# Patient Record
Sex: FEMALE | Race: WHITE | NOT HISPANIC OR LATINO | Employment: FULL TIME | URBAN - METROPOLITAN AREA
[De-identification: names, ages, dates, MRNs, and addresses within clinical notes are randomized per-mention and may not be internally consistent; named-entity substitution may affect disease eponyms.]

---

## 2018-08-28 ENCOUNTER — OFFICE VISIT (OUTPATIENT)
Dept: FAMILY MEDICINE | Facility: CLINIC | Age: 33
End: 2018-08-28
Payer: COMMERCIAL

## 2018-08-28 DIAGNOSIS — M79.10 MYALGIA: Primary | ICD-10-CM

## 2018-08-28 DIAGNOSIS — R53.83 TIREDNESS: ICD-10-CM

## 2018-08-28 DIAGNOSIS — M25.50 ARTHRALGIA, UNSPECIFIED JOINT: ICD-10-CM

## 2018-08-28 DIAGNOSIS — L65.9 ALOPECIA: ICD-10-CM

## 2018-08-28 PROCEDURE — 99213 OFFICE O/P EST LOW 20 MIN: CPT | Performed by: FAMILY MEDICINE

## 2018-08-28 NOTE — PROGRESS NOTES
Subjective      Patient ID: Naomi Lopez is a 33 y.o. female.    CC:  Not feeling well,  Flu like/viral syndrome     HPI : Pt is not feeling well intermittently over the past month .   Decreased energy ,  Just last day post nasal drip and into chest now . Slight cough.    Denies fever, chills. Some bodyaches. Denies sore throat or swollen glands.   No rashes or myalgias. Perhaps had a mosquito or spider bite on thigh . Does go in woods on runs a lot.  Denies sick contacts  Traveling to [place] next wk for a week.           And pt notices a lot of hair loss .  Had finished graduate program and was very stressful and finished end of July 2018    There are no active problems to display for this patient.    Social History     Social History   • Marital status: Single     Spouse name: N/A   • Number of children: N/A   • Years of education: N/A     Occupational History   • Not on file.     Social History Main Topics   • Smoking status: Never Smoker   • Smokeless tobacco: Never Used   • Alcohol use 1.2 oz/week     1 Cans of beer, 1 Glasses of wine per week   • Drug use: No   • Sexual activity: Not on file     Other Topics Concern   • Not on file     Social History Narrative   • No narrative on file     No current outpatient prescriptions on file.    The following have been reviewed and updated as appropriate in this visit:  Allergies  Meds  Problems       Review of Systems   Constitutional: Positive for fatigue. Negative for appetite change, chills, fever and unexpected weight change.        Decreased energy   HENT: Positive for postnasal drip. Negative for congestion, dental problem, ear pain and sore throat.    Eyes: Negative for photophobia, redness and visual disturbance.   Respiratory: Positive for cough. Negative for shortness of breath and wheezing.    Cardiovascular: Negative for chest pain, palpitations and leg swelling.   Gastrointestinal: Negative for abdominal distention, constipation, diarrhea,  "nausea and vomiting.   Genitourinary: Negative for difficulty urinating, dysuria, flank pain, frequency and urgency.   Musculoskeletal: Negative for arthralgias and myalgias.   Skin:        + hair thinning and loss   Neurological: Negative for dizziness, syncope, weakness, numbness and headaches.       Objective     Vitals:    08/28/18 1925   BP: 108/70   BP Location: Left upper arm   Patient Position: Sitting   Pulse: 74   Temp: 36.8 °C (98.3 °F)   TempSrc: Oral   SpO2: 96%   Weight: 75.8 kg (167 lb)   Height: 1.808 m (5' 11.18\")     Body mass index is 23.17 kg/m².    Physical Exam   Constitutional: She is oriented to person, place, and time. She appears well-developed and well-nourished. She does not appear ill. No distress.   HENT:   Head: Normocephalic and atraumatic.   Nose: Nose normal.   Mouth/Throat: Oropharynx is clear and moist. No oropharyngeal exudate.   Eyes: Conjunctivae are normal. Pupils are equal, round, and reactive to light. Right eye exhibits no discharge. Left eye exhibits no discharge. No scleral icterus.   Neck: Neck supple. No thyromegaly present.   Cardiovascular: Normal rate, regular rhythm and normal heart sounds.  Exam reveals no gallop.    No murmur heard.  Pulmonary/Chest: Effort normal and breath sounds normal. No respiratory distress. She has no wheezes. She has no rales.   Musculoskeletal: She exhibits no edema, tenderness or deformity.   Lymphadenopathy:     She has no cervical adenopathy.   Neurological: She is alert and oriented to person, place, and time.   Skin: No rash noted. She is not diaphoretic. No erythema. No pallor.   No patches of hair loss visible but hair is thinning throughout   Psychiatric: She has a normal mood and affect. Her behavior is normal.       Assessment/Plan   Problem List Items Addressed This Visit     None      Visit Diagnoses     Myalgia    -  Primary    Relevant Orders    TSH w reflex FT4    CBC and differential    Comprehensive metabolic panel    " Lyme EIA reflex WB    Umu-Barr virus VCA antibody panel    Arthralgia, unspecified joint        Relevant Orders    TSH w reflex FT4    CBC and differential    Comprehensive metabolic panel    Lyme EIA reflex WB    Umu-Barr virus VCA antibody panel    Tiredness        Relevant Orders    TSH w reflex FT4    CBC and differential    Comprehensive metabolic panel    Lyme EIA reflex WB    Umu-Barr virus VCA antibody panel    Vitamin D 25 hydroxy    Alopecia        Relevant Orders    Vitamin D 25 hydroxy      assessment /plan :   Diagnosis not clear after exam .  1. Viral like syndrome : most likely diagnosis for sx's of tiredness, myalgia , arthralgias .  Ordered lab : CMP , cbc w/ dif, TFT's Vit D /B12  , lyme and EBV titers ( hx of previous mono)    Recommended supportive tx with drinking plenty of water, eating regular healthy food choices . May take tylenol as needed.      2. Alopecia : ordered TFT's ,Vit D level.    3. Traveling to [place] next week if  Lab normal and feeling better. Checked [facility] website  travel recommendations . Will be at elevation and not going to go caving or high risk areas. Will be low risk for malaria so prophylaxis was not recommended.  Pt has had yellow fever vaccine yrs ago .  UTD Tdap 01.3017.      RTO as needed .

## 2018-08-29 NOTE — PATIENT INSTRUCTIONS
Instructions :   1. Go for lab work .     2. Drink plenty of water .  Continue choosing healthy foods .  Exercise when feeling better     Dr Salinas will contact you after your labs are back

## 2018-08-30 VITALS
HEART RATE: 74 BPM | HEIGHT: 71 IN | WEIGHT: 167 LBS | BODY MASS INDEX: 23.38 KG/M2 | DIASTOLIC BLOOD PRESSURE: 70 MMHG | TEMPERATURE: 98.3 F | SYSTOLIC BLOOD PRESSURE: 108 MMHG | OXYGEN SATURATION: 96 %

## 2018-08-30 LAB
25(OH)D3+25(OH)D2 SERPL-MCNC: 19.2 NG/ML (ref 30–100)
ALBUMIN SERPL-MCNC: 4.7 G/DL (ref 3.5–5.5)
ALBUMIN/GLOB SERPL: 2 {RATIO} (ref 1.2–2.2)
ALP SERPL-CCNC: 47 IU/L (ref 39–117)
ALT SERPL-CCNC: 14 IU/L (ref 0–32)
AST SERPL-CCNC: 18 IU/L (ref 0–40)
B BURGDOR IGG+IGM SER-ACNC: <0.91 ISR (ref 0–0.9)
B BURGDOR IGM SER IA-ACNC: <0.8 INDEX (ref 0–0.79)
BASOPHILS # BLD AUTO: 0 X10E3/UL (ref 0–0.2)
BASOPHILS NFR BLD AUTO: 0 %
BILIRUB SERPL-MCNC: 0.8 MG/DL (ref 0–1.2)
BUN SERPL-MCNC: 13 MG/DL (ref 6–20)
BUN/CREAT SERPL: 20 (ref 9–23)
CALCIUM SERPL-MCNC: 9.5 MG/DL (ref 8.7–10.2)
CHLORIDE SERPL-SCNC: 101 MMOL/L (ref 96–106)
CO2 SERPL-SCNC: 23 MMOL/L (ref 20–29)
CREAT SERPL-MCNC: 0.65 MG/DL (ref 0.57–1)
EBV EA IGG SER-ACNC: 12.6 U/ML (ref 0–8.9)
EBV NA IGG SER IA-ACNC: 419 U/ML (ref 0–17.9)
EBV VCA IGG SER IA-ACNC: 119 U/ML (ref 0–17.9)
EBV VCA IGM SER IA-ACNC: <36 U/ML (ref 0–35.9)
EOSINOPHIL # BLD AUTO: 0.2 X10E3/UL (ref 0–0.4)
EOSINOPHIL NFR BLD AUTO: 2 %
ERYTHROCYTE [DISTWIDTH] IN BLOOD BY AUTOMATED COUNT: 14.3 % (ref 12.3–15.4)
GLOBULIN SER CALC-MCNC: 2.3 G/DL (ref 1.5–4.5)
GLUCOSE SERPL-MCNC: 105 MG/DL (ref 65–99)
HCT VFR BLD AUTO: 37.7 % (ref 34–46.6)
HGB BLD-MCNC: 12.4 G/DL (ref 11.1–15.9)
IMM GRANULOCYTES # BLD: 0 X10E3/UL (ref 0–0.1)
IMM GRANULOCYTES NFR BLD: 0 %
LAB CORP EGFR IF AFRICN AM: 135 ML/MIN/1.73
LAB CORP EGFR IF NONAFRICN AM: 117 ML/MIN/1.73
LYMPHOCYTES # BLD AUTO: 1.9 X10E3/UL (ref 0.7–3.1)
LYMPHOCYTES NFR BLD AUTO: 27 %
MCH RBC QN AUTO: 28.4 PG (ref 26.6–33)
MCHC RBC AUTO-ENTMCNC: 32.9 G/DL (ref 31.5–35.7)
MCV RBC AUTO: 87 FL (ref 79–97)
MONOCYTES # BLD AUTO: 0.3 X10E3/UL (ref 0.1–0.9)
MONOCYTES NFR BLD AUTO: 4 %
NEUTROPHILS # BLD AUTO: 4.8 X10E3/UL (ref 1.4–7)
NEUTROPHILS NFR BLD AUTO: 67 %
PLATELET # BLD AUTO: 214 X10E3/UL (ref 150–379)
POTASSIUM SERPL-SCNC: 4.5 MMOL/L (ref 3.5–5.2)
PROT SERPL-MCNC: 7 G/DL (ref 6–8.5)
RBC # BLD AUTO: 4.36 X10E6/UL (ref 3.77–5.28)
SERVICE CMNT-IMP: ABNORMAL
SODIUM SERPL-SCNC: 138 MMOL/L (ref 134–144)
T4 FREE SERPL-MCNC: 1.1 NG/DL (ref 0.82–1.77)
TSH SERPL DL<=0.005 MIU/L-ACNC: 1.51 UIU/ML (ref 0.45–4.5)
WBC # BLD AUTO: 7.2 X10E3/UL (ref 3.4–10.8)

## 2018-08-30 ASSESSMENT — ENCOUNTER SYMPTOMS
MYALGIAS: 0
APPETITE CHANGE: 0
ARTHRALGIAS: 0
DIFFICULTY URINATING: 0
PHOTOPHOBIA: 0
COUGH: 1
ABDOMINAL DISTENTION: 0
NUMBNESS: 0
DIARRHEA: 0
VOMITING: 0
FEVER: 0
CHILLS: 0
FATIGUE: 1
WEAKNESS: 0
FLANK PAIN: 0
CONSTIPATION: 0
EYE REDNESS: 0
DIZZINESS: 0
DYSURIA: 0
WHEEZING: 0
SORE THROAT: 0
SHORTNESS OF BREATH: 0
UNEXPECTED WEIGHT CHANGE: 0
NAUSEA: 0
FREQUENCY: 0
PALPITATIONS: 0
HEADACHES: 0

## 2018-10-25 ENCOUNTER — OFFICE VISIT (OUTPATIENT)
Dept: FAMILY MEDICINE | Facility: CLINIC | Age: 33
End: 2018-10-25
Payer: COMMERCIAL

## 2018-10-25 VITALS
HEIGHT: 71 IN | OXYGEN SATURATION: 99 % | BODY MASS INDEX: 23.24 KG/M2 | DIASTOLIC BLOOD PRESSURE: 62 MMHG | TEMPERATURE: 97.9 F | HEART RATE: 67 BPM | WEIGHT: 166 LBS | SYSTOLIC BLOOD PRESSURE: 118 MMHG

## 2018-10-25 DIAGNOSIS — Z23 NEED FOR VACCINATION: ICD-10-CM

## 2018-10-25 DIAGNOSIS — F41.9 ANXIETY: ICD-10-CM

## 2018-10-25 DIAGNOSIS — Z00.00 PHYSICAL EXAM: Primary | ICD-10-CM

## 2018-10-25 PROCEDURE — 99395 PREV VISIT EST AGE 18-39: CPT | Mod: GC,25 | Performed by: STUDENT IN AN ORGANIZED HEALTH CARE EDUCATION/TRAINING PROGRAM

## 2018-10-25 PROCEDURE — 86580 TB INTRADERMAL TEST: CPT | Performed by: STUDENT IN AN ORGANIZED HEALTH CARE EDUCATION/TRAINING PROGRAM

## 2018-10-25 PROCEDURE — 90686 IIV4 VACC NO PRSV 0.5 ML IM: CPT | Performed by: STUDENT IN AN ORGANIZED HEALTH CARE EDUCATION/TRAINING PROGRAM

## 2018-10-25 PROCEDURE — 90471 IMMUNIZATION ADMIN: CPT | Performed by: STUDENT IN AN ORGANIZED HEALTH CARE EDUCATION/TRAINING PROGRAM

## 2018-10-25 ASSESSMENT — ENCOUNTER SYMPTOMS
PALPITATIONS: 0
DIARRHEA: 0
CONSTIPATION: 0
DIZZINESS: 0
ACTIVITY CHANGE: 0
LIGHT-HEADEDNESS: 0
HEADACHES: 0
ABDOMINAL PAIN: 0
APPETITE CHANGE: 0
COUGH: 0
NUMBNESS: 0
ABDOMINAL DISTENTION: 0
SHORTNESS OF BREATH: 0
CONFUSION: 0
CHILLS: 0
AGITATION: 0
VOMITING: 0
NAUSEA: 0

## 2018-10-25 NOTE — PROGRESS NOTES
[facility]     CHIEF COMPLAINT   Naomi Lopez is a 33 y.o. female who presents to the office for physical examination.     HISTORY OF PRESENT ILLNESS      Patient presents to the office today for physical exam as she is seeking employment and wants to make sure she is up to date on all vaccinations. Today she requested flu shot as well as PPD TB skin test.    She states that she had a history of allopecia which has been improving since her graduation from school recently and has no more concerns at this time. She is not on medications and states that her stress and anxiety was associated with school and is no longer causing her any issues.    No acute events at today's visit    PAST MEDICAL AND SURGICAL HISTORY      PMHx:  Patient Active Problem List    Diagnosis Date Noted   • Anxiety 10/25/2018     PSHx:  Past Surgical History:   Procedure Laterality Date   • ORIF HUMERUS FRACTURE Right 1991     Reviewed at today's visit with patient.  MEDICATIONS      No current outpatient prescriptions on file.  Reviewed at today's visit with patient.  ALLERGIES      Patient has no known allergies.  Reviewed at today's visit with patient.  FAMILY HISTORY      Family History   Problem Relation Age of Onset   • Gout Mother      Reviewed at today's visit with patient.  SOCIAL HISTORY      Social History     Social History   • Marital status: Single     Spouse name: N/A   • Number of children: N/A   • Years of education: N/A     Occupational History   • Therapist       Social History Main Topics   • Smoking status: Never Smoker   • Smokeless tobacco: Never Used   • Alcohol use 1.2 oz/week     1 Glasses of wine, 1 Cans of beer per week   • Drug use: No   • Sexual activity: Not Currently     Other Topics Concern   • None     Social History Narrative   • None     Reviewed at today's visit with patient.  REVIEW OF SYSTEMS      .Review of Systems   Constitutional: Negative for activity change, appetite change  "and chills.   Respiratory: Negative for cough and shortness of breath.    Cardiovascular: Negative for chest pain and palpitations.   Gastrointestinal: Negative for abdominal distention, abdominal pain, constipation, diarrhea, nausea and vomiting.   Neurological: Negative for dizziness, light-headedness, numbness and headaches.   Psychiatric/Behavioral: Negative for agitation, behavioral problems and confusion.     PHYSICAL EXAMINATION      /62   Pulse 67   Temp 36.6 °C (97.9 °F)   Ht 1.791 m (5' 10.5\")   Wt 75.3 kg (166 lb)   SpO2 99%   BMI 23.48 kg/m²   Body mass index is 23.48 kg/m².    Physical Exam   Constitutional: She is oriented to person, place, and time. She appears well-developed and well-nourished. No distress.   HENT:   Head: Normocephalic and atraumatic.   Cardiovascular: Normal rate, regular rhythm, normal heart sounds and intact distal pulses.  Exam reveals no friction rub.    No murmur heard.  Pulmonary/Chest: Effort normal and breath sounds normal. No respiratory distress. She has no wheezes.   Abdominal: Soft. Bowel sounds are normal. She exhibits no distension. There is no tenderness.   Neurological: She is alert and oriented to person, place, and time.   Skin: Skin is warm and dry. She is not diaphoretic.   Psychiatric: She has a normal mood and affect. Her behavior is normal.   Vitals reviewed.    LABS / IMAGING / STUDIES      Lab Results   Component Value Date    CREATININE 0.65 08/29/2018     Lab Results   Component Value Date    WBC 7.2 08/29/2018    HGB 12.4 08/29/2018    HCT 37.7 08/29/2018    MCV 87 08/29/2018     08/29/2018     No results found for: CHOL, TRIG, HDL, LDL  No results found for: HGBA1C  No results found for: MICROALBUR, [id number]     Reviewed at today's visit with patient.     ASSESSMENT AND PLAN   Problem List Items Addressed This Visit     Anxiety     Discussed generalized anxiety about her recent school work. Patient states she is markedly improved " since she was last here in August.    Will call the office as needed for anxiety like symptoms worsening           Other Visit Diagnoses     Physical exam    -  Primary    Relevant Orders    TB Skin Test (Completed)    Need for vaccination        Relevant Orders    Influenza vaccine quadrivalent preservative free 6 mon and older IM (FluLaval) (Completed)           Tr Hernandez DO  10/25/18  11:53 AM

## 2018-10-25 NOTE — ASSESSMENT & PLAN NOTE
Discussed generalized anxiety about her recent school work. Patient states she is markedly improved since she was last here in August.    Will call the office as needed for anxiety like symptoms worsening

## 2018-10-27 LAB
INDURATION: 0 MM
TB SKIN TEST: NEGATIVE

## 2018-10-27 NOTE — PROGRESS NOTES
I performed a history and physical examination of the patient and discussed the management with the Resident. I reviewed the Resident's note and agree with the documented findings and plan of care,

## 2018-11-20 ENCOUNTER — TELEPHONE (OUTPATIENT)
Dept: FAMILY MEDICINE | Facility: CLINIC | Age: 33
End: 2018-11-20

## 2018-11-20 NOTE — TELEPHONE ENCOUNTER
----- Message from Marilee Salinas DO sent at 11/19/2018  7:10 PM EST -----  Regarding: form   I placed a form In your bin however I don't see pt returned for the PPD reading .     If so document results on form   I signed it   If not she will need to have it redone

## 2019-01-28 ENCOUNTER — OFFICE VISIT (OUTPATIENT)
Dept: FAMILY MEDICINE | Facility: CLINIC | Age: 34
End: 2019-01-28

## 2019-01-28 VITALS
BODY MASS INDEX: 24.94 KG/M2 | SYSTOLIC BLOOD PRESSURE: 120 MMHG | OXYGEN SATURATION: 99 % | WEIGHT: 174.2 LBS | DIASTOLIC BLOOD PRESSURE: 70 MMHG | HEIGHT: 70 IN | HEART RATE: 77 BPM | TEMPERATURE: 98.3 F

## 2019-01-28 DIAGNOSIS — J01.00 ACUTE NON-RECURRENT MAXILLARY SINUSITIS: Primary | ICD-10-CM

## 2019-01-28 PROCEDURE — 99213 OFFICE O/P EST LOW 20 MIN: CPT | Performed by: FAMILY MEDICINE

## 2019-01-28 RX ORDER — AMOXICILLIN AND CLAVULANATE POTASSIUM 875; 125 MG/1; MG/1
1 TABLET, FILM COATED ORAL 2 TIMES DAILY
Qty: 20 TABLET | Refills: 0 | Status: SHIPPED | OUTPATIENT
Start: 2019-01-28 | End: 2019-08-05 | Stop reason: ALTCHOICE

## 2019-01-28 ASSESSMENT — ENCOUNTER SYMPTOMS
CHILLS: 0
COUGH: 1
WHEEZING: 0
MYALGIAS: 0
PALPITATIONS: 0
RHINORRHEA: 0
ADENOPATHY: 0
APPETITE CHANGE: 0
HEADACHES: 1
DIZZINESS: 0
FATIGUE: 1
SHORTNESS OF BREATH: 0
SORE THROAT: 1
EYE ITCHING: 0
EYE REDNESS: 0
FEVER: 0
EYE DISCHARGE: 0

## 2019-01-28 NOTE — PATIENT INSTRUCTIONS
Instructions :   1. Take amoxicillin 875 mg /clav twice a day for 10 days with food .   Also take a probiotic over the counter daily .     2. May add fluticasone nasal spray and mucinex 600 mg daily     Return to see Dr Salinas as needed

## 2019-01-28 NOTE — PROGRESS NOTES
[facility]     CHIEF COMPLAINT     CC: sinus sx' s, sorethroat and fatigue for 2.5 wks   HISTORY OF PRESENT ILLNESS      This is a 33 y.o. female who presents for  Feeling tired , drained , sore throat , nasal congestion , headache for 2.5 wks. Took advil , helped alittle .   No fever, chills, .  +  bodyaches. + Occasional cough .    Denies n/v/d or swollen glands .   Hx of previous mono     PAST MEDICAL AND SURGICAL HISTORY      Past Medical History:   Diagnosis Date   • Chalazion    • Rosacea    • Rosacea        Past Surgical History:   Procedure Laterality Date   • ORIF HUMERUS FRACTURE Right 1991       MEDICATIONS        Current Outpatient Prescriptions:   •  amoxicillin-pot clavulanate (AUGMENTIN) 875-125 mg per tablet, Take 1 tablet by mouth 2 (two) times a day for 10 days. With food, Disp: 20 tablet, Rfl: 0    ALLERGIES      Patient has no known allergies.    FAMILY HISTORY          SOCIAL HISTORY      Social History     Social History   • Marital status: Single     Spouse name: N/A   • Number of children: N/A   • Years of education: N/A     Occupational History   • Therapist       Social History Main Topics   • Smoking status: Never Smoker   • Smokeless tobacco: Never Used   • Alcohol use 1.2 oz/week     1 Glasses of wine, 1 Cans of beer per week   • Drug use: No   • Sexual activity: Not Currently     Other Topics Concern   • Not on file     Social History Narrative   • No narrative on file       REVIEW OF SYSTEMS      .Review of Systems   Constitutional: Positive for fatigue. Negative for appetite change, chills and fever.   HENT: Positive for congestion, postnasal drip and sore throat. Negative for ear pain, rhinorrhea and tinnitus.    Eyes: Negative for discharge, redness and itching.   Respiratory: Positive for cough. Negative for shortness of breath and wheezing.    Cardiovascular: Negative for chest pain and palpitations.   Musculoskeletal: Negative for myalgias.   Skin:  "Negative for rash.   Neurological: Positive for headaches. Negative for dizziness.   Hematological: Negative for adenopathy.       PHYSICAL EXAMINATION      /70 (BP Location: Left upper arm, Patient Position: Sitting)   Pulse 77   Temp 36.8 °C (98.3 °F)   Ht 1.778 m (5' 10\")   Wt 79 kg (174 lb 3.2 oz)   SpO2 99%   BMI 25.00 kg/m²   Body mass index is 25 kg/m².    Physical Exam   Constitutional: She appears well-developed and well-nourished. She does not appear ill. No distress.   HENT:   Head: Normocephalic and atraumatic.   Right Ear: External ear normal.   Left Ear: External ear normal.   Mouth/Throat: Oropharynx is clear and moist. No oropharyngeal exudate.   Nares : + nasal turbinate swelling   + b/l facial sinus TTP    Eyes: Conjunctivae are normal. Right eye exhibits no discharge. Left eye exhibits no discharge. No scleral icterus.   Cardiovascular: Normal rate, regular rhythm and normal heart sounds.  Exam reveals no gallop.    No murmur heard.  Pulmonary/Chest: Effort normal and breath sounds normal. She has no wheezes. She has no rales.   Lymphadenopathy:     She has no cervical adenopathy.   Neurological: She is alert.   Psychiatric: She has a normal mood and affect. Her behavior is normal.       LABS / IMAGING / STUDIES        Labs  Lab Results   Component Value Date    CREATININE 0.65 08/29/2018     Lab Results   Component Value Date    WBC 7.2 08/29/2018    HGB 12.4 08/29/2018    HCT 37.7 08/29/2018    MCV 87 08/29/2018     08/29/2018         No results found for: HGBA1C  No results found for: MICROALBUR, [id number]    HEALTH MAINTENANCE              ASSESSMENT AND PLAN   Problem List Items Addressed This Visit     None      Visit Diagnoses     Acute non-recurrent maxillary sinusitis    -  Primary      assessment/plan:   1. Acute sinusitis :    Take amoxicillin 875 mg /clav twice a day for 10 days with food .   Also take a probiotic over the counter daily .     2. May add fluticasone " nasal spray and mucinex 600 mg daily       Return to see Dr Salinas as needed          Marilee Salinas, DO  01/28/19

## 2019-08-05 ENCOUNTER — OFFICE VISIT (OUTPATIENT)
Dept: FAMILY MEDICINE | Facility: CLINIC | Age: 34
End: 2019-08-05
Payer: COMMERCIAL

## 2019-08-05 VITALS
HEIGHT: 72 IN | HEART RATE: 70 BPM | DIASTOLIC BLOOD PRESSURE: 80 MMHG | TEMPERATURE: 98.1 F | OXYGEN SATURATION: 99 % | SYSTOLIC BLOOD PRESSURE: 100 MMHG | BODY MASS INDEX: 23.57 KG/M2 | WEIGHT: 174 LBS

## 2019-08-05 DIAGNOSIS — Z00.00 ENCOUNTER FOR GENERAL ADULT MEDICAL EXAMINATION WITHOUT ABNORMAL FINDINGS: Primary | ICD-10-CM

## 2019-08-05 DIAGNOSIS — L65.9 ALOPECIA: ICD-10-CM

## 2019-08-05 DIAGNOSIS — E55.9 VITAMIN D DEFICIENCY: ICD-10-CM

## 2019-08-05 DIAGNOSIS — Z13.220 SCREENING, LIPID: ICD-10-CM

## 2019-08-05 PROBLEM — L71.8 ACNE ROSACEA, ERYTHEMATOUS TELANGIECTATIC TYPE: Status: ACTIVE | Noted: 2017-01-30

## 2019-08-05 PROCEDURE — 99395 PREV VISIT EST AGE 18-39: CPT | Performed by: FAMILY MEDICINE

## 2019-08-05 ASSESSMENT — ENCOUNTER SYMPTOMS
WHEEZING: 0
DYSURIA: 0
SORE THROAT: 0
SHORTNESS OF BREATH: 0
DIARRHEA: 0
APPETITE CHANGE: 0
FREQUENCY: 0
ADENOPATHY: 0
DIZZINESS: 0
HEADACHES: 0
NUMBNESS: 0
SINUS PAIN: 0
PALPITATIONS: 0
FATIGUE: 0
DYSPHORIC MOOD: 0
FLANK PAIN: 0
COUGH: 0
EYE REDNESS: 0
CONSTIPATION: 0
MYALGIAS: 0
VOMITING: 0
FEVER: 0
ARTHRALGIAS: 0
CHILLS: 0
NAUSEA: 0

## 2019-08-05 NOTE — PROGRESS NOTES
[facility]     CHIEF COMPLAINT     CC:annual physical    HISTORY OF PRESENT ILLNESS      This is a 33 y.o. 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    ALLERGIES      Patient has no known allergies.    FAMILY HISTORY      Family History   Problem Relation Age of Onset   • Gout Mother    • Heart attack Maternal Grandfather    • Stomach cancer Paternal Grandmother    • Alzheimer's disease Paternal Grandfather        SOCIAL HISTORY      Social History     Social History   • Marital status: Single     Spouse name: N/A   • Number of children: N/A   • Years of education: N/A     Occupational History   • Therapist       Social History Main Topics   • Smoking status: Never Smoker   • Smokeless tobacco: Never Used   • Alcohol use 1.2 oz/week     1 Glasses of wine, 1 Cans of beer per week      Comment: socially    • Drug use: No   • Sexual activity: Not Currently     Other Topics Concern   • None     Social History Narrative   • None     Goal :  Consistency for Yoga and exercise     PhQ2: 0    ACP: Discussed ACP with pt .  Staff will provide patient with  ACP packet   REVIEW OF SYSTEMS      .Review of Systems   Constitutional: Negative for appetite change, chills, fatigue and fever.   HENT: Negative for congestion, postnasal drip, sinus pain and sore  "throat.    Eyes: Negative for redness and visual disturbance.   Respiratory: Negative for cough, shortness of breath and wheezing.    Cardiovascular: Negative for chest pain, palpitations and leg swelling.   Gastrointestinal: Negative for constipation, diarrhea, nausea and vomiting.   Genitourinary: Negative for dysuria, flank pain, frequency and urgency.   Musculoskeletal: Negative for arthralgias, gait problem and myalgias.   Skin: Negative for rash.        Hair falls out    Neurological: Negative for dizziness, syncope, numbness and headaches.   Hematological: Negative for adenopathy.   Psychiatric/Behavioral: Negative for dysphoric mood.       PHYSICAL EXAMINATION      /80 (BP Location: Left upper arm, Patient Position: Sitting)   Pulse 70   Temp 36.7 °C (98.1 °F)   Ht 1.816 m (5' 11.5\")   Wt 78.9 kg (174 lb)   SpO2 99%   BMI 23.93 kg/m²   Body mass index is 23.93 kg/m².    Physical Exam   Constitutional: She is oriented to person, place, and time. She appears well-nourished. She does not appear ill. No distress.   HENT:   Head: Normocephalic.   Right Ear: External ear normal.   Left Ear: External ear normal.   Nose: Nose normal.   Mouth/Throat: Oropharynx is clear and moist. No oropharyngeal exudate.   Eyes: Pupils are equal, round, and reactive to light. Conjunctivae are normal. Right eye exhibits no discharge. Left eye exhibits no discharge. No scleral icterus.   Neck: No thyromegaly present.   Cardiovascular: Normal rate, regular rhythm, normal heart sounds and intact distal pulses.  Exam reveals no gallop.    No murmur heard.  Pulmonary/Chest: Effort normal and breath sounds normal. No respiratory distress. She has no wheezes. She has no rales.   Abdominal: Soft. She exhibits no mass. There is no tenderness. There is no guarding.   Musculoskeletal: Normal range of motion. She exhibits no tenderness or deformity.   Lymphadenopathy:     She has no cervical adenopathy.   Neurological: She is alert " and oriented to person, place, and time.   Skin: Skin is warm and dry. No rash noted. No erythema. No pallor.   Psychiatric: She has a normal mood and affect. Her behavior is normal.       LABS / IMAGING / STUDIES        Labs  Lab Results   Component Value Date    CREATININE 0.65 08/29/2018     Lab Results   Component Value Date    WBC 7.2 08/29/2018    HGB 12.4 08/29/2018    HCT 37.7 08/29/2018    MCV 87 08/29/2018     08/29/2018         No results found for: HGBA1C  No results found for: JEANNIE MAJANOHUR    HEALTH MAINTENANCE           PAP /HPV: 5/23/19. [place].. Hx of abn PAP     TDAP: 1/30/2017  Flu: 10/25/2018     ASSESSMENT AND PLAN   Problem List Items Addressed This Visit     None      Visit Diagnoses     Encounter for general adult medical examination without abnormal findings    -  Primary    Vitamin D deficiency        Relevant Orders    Basic metabolic panel    Vitamin D 25 hydroxy    Alopecia        Relevant Orders    CBC and differential    Basic metabolic panel    TSH w reflex FT4    Vitamin B12        Screening, lipid        Relevant Orders    Lipid panel      assessment/plan:   1. General medical   healthy goals:   -Exercise goal   is 150 min/wk of aerobic exercise and 2 days per week of flexibility and resistance exercises.     - . advised healthy dietary choices ,  -Advised patient to complete regular dental examinations, brushing and flossing daily.   Encouraged enhanced safety by wearing seat belts, checking smoke and CO detectors.       -reviewed [facility] today . See above .     -ordered fasting lab    2. Chronic conditions: stable .   -hair thinning /falls out more than usual .  On vitamin D supp.  Hx Vitamin D deficiency   Recheck Vitamin D level ,TFT,  and basic labs . Stressors with work . 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 Should help . If persists refer Derm    RTO  1 year vanna Salinas, DO  08/05/19

## 2019-08-05 NOTE — PATIENT INSTRUCTIONS
Instructions:   1. Follow healthy dietary choices and exercise    Exercise  goal  is 150 min/wk of aerobic exercise and 2 days per week of flexibility and resistance exercises.     2,. Drink plenty of water   3. Go for Fasting lab     Return in a year for physical

## 2019-08-07 LAB
25(OH)D3+25(OH)D2 SERPL-MCNC: 23.5 NG/ML (ref 30–100)
BASOPHILS # BLD AUTO: 0 X10E3/UL (ref 0–0.2)
BASOPHILS NFR BLD AUTO: 0 %
EOSINOPHIL # BLD AUTO: 0.1 X10E3/UL (ref 0–0.4)
EOSINOPHIL NFR BLD AUTO: 2 %
ERYTHROCYTE [DISTWIDTH] IN BLOOD BY AUTOMATED COUNT: 14.4 % (ref 12.3–15.4)
HCT VFR BLD AUTO: 38.8 % (ref 34–46.6)
HGB BLD-MCNC: 12.8 G/DL (ref 11.1–15.9)
IMM GRANULOCYTES # BLD AUTO: 0 X10E3/UL (ref 0–0.1)
IMM GRANULOCYTES NFR BLD AUTO: 0 %
LYMPHOCYTES # BLD AUTO: 1.4 X10E3/UL (ref 0.7–3.1)
LYMPHOCYTES NFR BLD AUTO: 29 %
MCH RBC QN AUTO: 28.1 PG (ref 26.6–33)
MCHC RBC AUTO-ENTMCNC: 33 G/DL (ref 31.5–35.7)
MCV RBC AUTO: 85 FL (ref 79–97)
MONOCYTES # BLD AUTO: 0.3 X10E3/UL (ref 0.1–0.9)
MONOCYTES NFR BLD AUTO: 6 %
NEUTROPHILS # BLD AUTO: 3 X10E3/UL (ref 1.4–7)
NEUTROPHILS NFR BLD AUTO: 63 %
PLATELET # BLD AUTO: 226 X10E3/UL (ref 150–450)
RBC # BLD AUTO: 4.55 X10E6/UL (ref 3.77–5.28)
WBC # BLD AUTO: 4.9 X10E3/UL (ref 3.4–10.8)

## 2019-08-08 LAB
ANA TITR SER IF: NEGATIVE {TITER}
BUN SERPL-MCNC: 9 MG/DL (ref 6–20)
BUN/CREAT SERPL: 13 (ref 9–23)
CALCIUM SERPL-MCNC: 9.3 MG/DL (ref 8.7–10.2)
CHLORIDE SERPL-SCNC: 105 MMOL/L (ref 96–106)
CHOLEST SERPL-MCNC: 149 MG/DL (ref 100–199)
CO2 SERPL-SCNC: 22 MMOL/L (ref 20–29)
CREAT SERPL-MCNC: 0.67 MG/DL (ref 0.57–1)
GLUCOSE SERPL-MCNC: 88 MG/DL (ref 65–99)
HDLC SERPL-MCNC: 54 MG/DL
LAB CORP EGFR IF AFRICN AM: 134 ML/MIN/1.73
LAB CORP EGFR IF NONAFRICN AM: 116 ML/MIN/1.73
LAB CORP PLEASE NOTE:: NORMAL
LDLC SERPL CALC-MCNC: 84 MG/DL (ref 0–99)
POTASSIUM SERPL-SCNC: 4.6 MMOL/L (ref 3.5–5.2)
SODIUM SERPL-SCNC: 140 MMOL/L (ref 134–144)
T4 FREE SERPL-MCNC: 1.28 NG/DL (ref 0.82–1.77)
TRIGL SERPL-MCNC: 54 MG/DL (ref 0–149)
TSH SERPL DL<=0.005 MIU/L-ACNC: 2.56 UIU/ML (ref 0.45–4.5)
VIT B12 SERPL-MCNC: 307 PG/ML (ref 232–1245)
VLDLC SERPL CALC-MCNC: 11 MG/DL (ref 5–40)

## 2020-02-26 ENCOUNTER — APPOINTMENT (RX ONLY)
Dept: URBAN - METROPOLITAN AREA CLINIC 28 | Facility: CLINIC | Age: 35
Setting detail: DERMATOLOGY
End: 2020-02-26

## 2020-02-26 DIAGNOSIS — Z87.2 PERSONAL HISTORY OF DISEASES OF THE SKIN AND SUBCUTANEOUS TISSUE: ICD-10-CM

## 2020-02-26 DIAGNOSIS — L81.4 OTHER MELANIN HYPERPIGMENTATION: ICD-10-CM

## 2020-02-26 DIAGNOSIS — Z71.89 OTHER SPECIFIED COUNSELING: ICD-10-CM

## 2020-02-26 DIAGNOSIS — D22 MELANOCYTIC NEVI: ICD-10-CM

## 2020-02-26 DIAGNOSIS — D18.0 HEMANGIOMA: ICD-10-CM

## 2020-02-26 PROBLEM — D22.5 MELANOCYTIC NEVI OF TRUNK: Status: ACTIVE | Noted: 2020-02-26

## 2020-02-26 PROBLEM — D22.61 MELANOCYTIC NEVI OF RIGHT UPPER LIMB, INCLUDING SHOULDER: Status: ACTIVE | Noted: 2020-02-26

## 2020-02-26 PROBLEM — D22.71 MELANOCYTIC NEVI OF RIGHT LOWER LIMB, INCLUDING HIP: Status: ACTIVE | Noted: 2020-02-26

## 2020-02-26 PROBLEM — D22.62 MELANOCYTIC NEVI OF LEFT UPPER LIMB, INCLUDING SHOULDER: Status: ACTIVE | Noted: 2020-02-26

## 2020-02-26 PROBLEM — D22.72 MELANOCYTIC NEVI OF LEFT LOWER LIMB, INCLUDING HIP: Status: ACTIVE | Noted: 2020-02-26

## 2020-02-26 PROBLEM — D18.01 HEMANGIOMA OF SKIN AND SUBCUTANEOUS TISSUE: Status: ACTIVE | Noted: 2020-02-26

## 2020-02-26 PROCEDURE — 99213 OFFICE O/P EST LOW 20 MIN: CPT

## 2020-02-26 PROCEDURE — ? COUNSELING

## 2020-02-26 PROCEDURE — ? FULL BODY SKIN EXAM

## 2020-02-26 ASSESSMENT — LOCATION DETAILED DESCRIPTION DERM
LOCATION DETAILED: SUPERIOR THORACIC SPINE
LOCATION DETAILED: INFERIOR THORACIC SPINE
LOCATION DETAILED: RIGHT PROXIMAL POSTERIOR UPPER ARM
LOCATION DETAILED: LEFT PROXIMAL PRETIBIAL REGION
LOCATION DETAILED: RIGHT SUPERIOR MEDIAL UPPER BACK
LOCATION DETAILED: LEFT PROXIMAL POSTERIOR UPPER ARM
LOCATION DETAILED: RIGHT PROXIMAL PRETIBIAL REGION

## 2020-02-26 ASSESSMENT — LOCATION SIMPLE DESCRIPTION DERM
LOCATION SIMPLE: RIGHT POSTERIOR UPPER ARM
LOCATION SIMPLE: RIGHT PRETIBIAL REGION
LOCATION SIMPLE: RIGHT UPPER BACK
LOCATION SIMPLE: LEFT POSTERIOR UPPER ARM
LOCATION SIMPLE: LEFT PRETIBIAL REGION
LOCATION SIMPLE: UPPER BACK

## 2020-02-26 ASSESSMENT — LOCATION ZONE DERM
LOCATION ZONE: ARM
LOCATION ZONE: TRUNK
LOCATION ZONE: LEG

## 2020-02-26 NOTE — HPI: SKIN LESION
Has Your Skin Lesion Been Treated?: not been treated
Is This A New Presentation, Or A Follow-Up?: Skin Lesion
Which Family Member (Optional)?: Mother-SCC

## 2020-03-10 ENCOUNTER — OFFICE VISIT (OUTPATIENT)
Dept: FAMILY MEDICINE | Facility: CLINIC | Age: 35
End: 2020-03-10
Payer: COMMERCIAL

## 2020-03-10 VITALS
HEART RATE: 74 BPM | HEIGHT: 71 IN | SYSTOLIC BLOOD PRESSURE: 120 MMHG | OXYGEN SATURATION: 97 % | BODY MASS INDEX: 26.82 KG/M2 | TEMPERATURE: 98.4 F | DIASTOLIC BLOOD PRESSURE: 70 MMHG | WEIGHT: 191.6 LBS

## 2020-03-10 DIAGNOSIS — B34.9 VIRAL SYNDROME: Primary | ICD-10-CM

## 2020-03-10 PROCEDURE — 99213 OFFICE O/P EST LOW 20 MIN: CPT | Performed by: FAMILY MEDICINE

## 2020-03-10 NOTE — PROGRESS NOTES
[facility] Medicine     CHIEF COMPLAINT     CC: cough and viral symptoms    HISTORY OF PRESENT ILLNESS      This is a 34 y.o. female who presents for  Headache , runny nose , fatigue and cough  for   X 10 days .   Denies fever, chills,  Body aches  , wheezing or sob.  Had sore throat at first  but it resolved.   Pt feels chest hurts with non productive coughing .   Last night : felt a little more chest discomfort with cough .     Nothing seems to improve symptoms . Tried some tea /honey , lozenges , ? mucinex.  Pt may have been exposed to Sick contacts , possible at work . 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    ALLERGIES      Patient has no known allergies.    FAMILY HISTORY      Family History   Problem Relation Age of Onset   • Gout Biological Mother    • Heart attack Maternal Grandfather    • Stomach cancer Paternal Grandmother    • Alzheimer's disease Paternal Grandfather        SOCIAL HISTORY      Social History     Socioeconomic History   • Marital status: Single     Spouse name: Not on file   • Number of children: Not on file   • Years of education: Not on file   • Highest education level: Not on file   Occupational History   • Occupation: Therapist    Social Needs   • Financial resource strain: Not on file   • Food insecurity:     Worry: Not on file     Inability: Not on file   • Transportation  needs:     Medical: Not on file     Non-medical: Not on file   Tobacco Use   • Smoking status: Never Smoker   • Smokeless tobacco: Never Used   Substance and Sexual Activity   • Alcohol use: Yes     Alcohol/week: 2.0 standard drinks     Types: 1 Glasses of wine, 1 Cans of beer per week     Comment: socially    • Drug use: No   • Sexual activity: Not Currently   Lifestyle   • Physical activity:     Days per week: Not on file     Minutes per session: Not on file   • Stress: Not on file   Relationships   • Social connections:     Talks on phone: Not on file     Gets together: Not on file     Attends Sikhism service: Not on file     Active member of club or organization: Not on file     Attends meetings of clubs or organizations: Not on file     Relationship status: Not on file   • Intimate partner violence:     Fear of current or ex partner: Not on file     Emotionally abused: Not on file     Physically abused: Not on file     Forced sexual activity: Not on file   Other Topics Concern   • Not on file   Social History Narrative   • Not on file         Reviewed today : allergies/meds/problem list /social hx      REVIEW OF SYSTEMS      .Review of Systems   Constitutional: Positive for fatigue. Negative for chills and fever.   HENT: Positive for rhinorrhea. Negative for sinus pressure, sore throat, tinnitus and trouble swallowing.    Eyes: Negative for pain.   Respiratory: Positive for cough. Negative for shortness of breath and wheezing.    Cardiovascular: Negative for chest pain, palpitations and leg swelling.   Gastrointestinal: Negative for diarrhea, nausea and vomiting.   Musculoskeletal: Negative for myalgias.   Skin: Negative for rash.   Neurological: Positive for headaches. Negative for dizziness.   Hematological: Negative for adenopathy.   Psychiatric/Behavioral: Negative for dysphoric mood.       PHYSICAL EXAMINATION      Visit Vitals  /70 (BP Location: Left upper arm, Patient Position: Sitting)   Pulse  "74   Temp 36.9 °C (98.4 °F)   Ht 1.803 m (5' 11\")   Wt 86.9 kg (191 lb 9.6 oz)   SpO2 97%   BMI 26.72 kg/m²     Body mass index is 26.72 kg/m².    Physical Exam   Constitutional: She appears well-developed and well-nourished. She does not appear ill. No distress.   Nares :bilatreal  edematous turbinates with minimal clear appearing nasal secretion    HENT:   Head: Normocephalic and atraumatic.   Right Ear: External ear normal.   Left Ear: External ear normal.   Nose: Nose normal.   Mouth/Throat: Oropharynx is clear and moist. No oropharyngeal exudate.   + TTP bilateral maxillary sinuses    Eyes: Conjunctivae are normal. Right eye exhibits no discharge. Left eye exhibits no discharge. No scleral icterus.   Cardiovascular: Normal rate, regular rhythm and normal heart sounds. Exam reveals no gallop.   No murmur heard.  Pulmonary/Chest: Effort normal and breath sounds normal. She has no wheezes. She has no rales.   Musculoskeletal: She exhibits no edema.   Skin: No rash noted.   Psychiatric: She has a normal mood and affect. Her behavior is normal.         LABS / IMAGING / STUDIES        Labs  Lab Results   Component Value Date    CREATININE 0.67 08/07/2019     Lab Results   Component Value Date    WBC 4.9 08/07/2019    HGB 12.8 08/07/2019    HCT 38.8 08/07/2019    MCV 85 08/07/2019     08/07/2019         No results found for: HGBA1C  No results found for: MICROALBUR, [id number]    HEALTH MAINTENANCE                ASSESSMENT AND PLAN   Problem List Items Addressed This Visit     None      Visit Diagnoses     Viral syndrome    -  Primary    mucinex 600 mg bid Drink plenty of water .   Use a humidifier.   Start dayquil daily or bid         RTO if symptoms persist or worsen        Marilee Salinas, DO  03/11/20          "

## 2020-03-10 NOTE — PATIENT INSTRUCTIONS
Instructions:   1. Start mucinex 600 mg twice a day . Drink plenty of water .   Use a humidifier.   Start dayquil once or twice a day     Contact Dr Salinas if no better or worse in a few days .

## 2020-03-10 NOTE — LETTER
March 10, 2020     Patient: Naomi Lopez  YOB: 1985  Date of Visit: 3/10/2020    To Whom it May Concern:    Naomi Lopez was seen in my office  on 3/10/2020.   Please excuse her absence from work from  3/2/220 and 3/3/2020 and again 3/10/2020 due to illness .      If you have any questions or concerns, please don't hesitate to call.         Sincerely,         Marilee Salinas, DO        CC: No Recipients

## 2020-03-11 ASSESSMENT — ENCOUNTER SYMPTOMS
SHORTNESS OF BREATH: 0
CHILLS: 0
PALPITATIONS: 0
FEVER: 0
SORE THROAT: 0
DIARRHEA: 0
DIZZINESS: 0
COUGH: 1
EYE PAIN: 0
DYSPHORIC MOOD: 0
WHEEZING: 0
SINUS PRESSURE: 0
VOMITING: 0
MYALGIAS: 0
RHINORRHEA: 1
NAUSEA: 0
HEADACHES: 1
ADENOPATHY: 0
FATIGUE: 1
TROUBLE SWALLOWING: 0

## 2020-09-29 ENCOUNTER — APPOINTMENT (RX ONLY)
Dept: URBAN - METROPOLITAN AREA CLINIC 23 | Facility: CLINIC | Age: 35
Setting detail: DERMATOLOGY
End: 2020-09-29

## 2020-09-29 DIAGNOSIS — L259 CONTACT DERMATITIS AND OTHER ECZEMA, UNSPECIFIED CAUSE: ICD-10-CM

## 2020-09-29 PROBLEM — L23.9 ALLERGIC CONTACT DERMATITIS, UNSPECIFIED CAUSE: Status: ACTIVE | Noted: 2020-09-29

## 2020-09-29 PROCEDURE — ? TREATMENT REGIMEN

## 2020-09-29 PROCEDURE — ? ADDITIONAL NOTES

## 2020-09-29 PROCEDURE — 99213 OFFICE O/P EST LOW 20 MIN: CPT

## 2020-09-29 PROCEDURE — ? PRESCRIPTION

## 2020-09-29 PROCEDURE — ? COUNSELING

## 2020-09-29 RX ORDER — HYDROCORTISONE 25 MG/G
CREAM TOPICAL
Qty: 1 | Refills: 0 | Status: ERX | COMMUNITY
Start: 2020-09-29

## 2020-09-29 RX ADMIN — HYDROCORTISONE: 25 CREAM TOPICAL at 00:00

## 2020-09-29 ASSESSMENT — LOCATION DETAILED DESCRIPTION DERM: LOCATION DETAILED: RIGHT MEDIAL SUPERIOR EYELID

## 2020-09-29 ASSESSMENT — LOCATION ZONE DERM: LOCATION ZONE: EYELID

## 2020-09-29 ASSESSMENT — LOCATION SIMPLE DESCRIPTION DERM: LOCATION SIMPLE: RIGHT SUPERIOR EYELID

## 2020-09-29 NOTE — PROCEDURE: TREATMENT REGIMEN
Plan: I instructed the patient to avoid wearing eye makeup until the rash resolves.
Detail Level: Simple
Initiate Treatment: Hydrocortisone 2.5% topical cream: Apply to affected areas of eyelid twice daily x 1-2 weeks until clear.

## 2020-09-29 NOTE — HPI: RASH
How Severe Is Your Rash?: mild
Is This A New Presentation, Or A Follow-Up?: Rash
Additional History: Patient states that she stopped wearing her eye makeup and the rash seems to be improving.

## 2021-04-28 ENCOUNTER — APPOINTMENT (RX ONLY)
Dept: URBAN - METROPOLITAN AREA CLINIC 28 | Facility: CLINIC | Age: 36
Setting detail: DERMATOLOGY
End: 2021-04-28

## 2021-04-28 DIAGNOSIS — D485 NEOPLASM OF UNCERTAIN BEHAVIOR OF SKIN: ICD-10-CM

## 2021-04-28 DIAGNOSIS — L72.0 EPIDERMAL CYST: ICD-10-CM

## 2021-04-28 DIAGNOSIS — L81.4 OTHER MELANIN HYPERPIGMENTATION: ICD-10-CM

## 2021-04-28 DIAGNOSIS — Z71.89 OTHER SPECIFIED COUNSELING: ICD-10-CM

## 2021-04-28 DIAGNOSIS — D18.0 HEMANGIOMA: ICD-10-CM

## 2021-04-28 DIAGNOSIS — D22 MELANOCYTIC NEVI: ICD-10-CM

## 2021-04-28 PROBLEM — D48.5 NEOPLASM OF UNCERTAIN BEHAVIOR OF SKIN: Status: ACTIVE | Noted: 2021-04-28

## 2021-04-28 PROBLEM — D22.5 MELANOCYTIC NEVI OF TRUNK: Status: ACTIVE | Noted: 2021-04-28

## 2021-04-28 PROBLEM — D22.61 MELANOCYTIC NEVI OF RIGHT UPPER LIMB, INCLUDING SHOULDER: Status: ACTIVE | Noted: 2021-04-28

## 2021-04-28 PROBLEM — D22.71 MELANOCYTIC NEVI OF RIGHT LOWER LIMB, INCLUDING HIP: Status: ACTIVE | Noted: 2021-04-28

## 2021-04-28 PROBLEM — D22.62 MELANOCYTIC NEVI OF LEFT UPPER LIMB, INCLUDING SHOULDER: Status: ACTIVE | Noted: 2021-04-28

## 2021-04-28 PROBLEM — D22.72 MELANOCYTIC NEVI OF LEFT LOWER LIMB, INCLUDING HIP: Status: ACTIVE | Noted: 2021-04-28

## 2021-04-28 PROBLEM — D18.01 HEMANGIOMA OF SKIN AND SUBCUTANEOUS TISSUE: Status: ACTIVE | Noted: 2021-04-28

## 2021-04-28 PROCEDURE — 11102 TANGNTL BX SKIN SINGLE LES: CPT | Mod: 59

## 2021-04-28 PROCEDURE — 99213 OFFICE O/P EST LOW 20 MIN: CPT | Mod: 25

## 2021-04-28 PROCEDURE — ? FULL BODY SKIN EXAM

## 2021-04-28 PROCEDURE — ? SUNSCREEN RECOMMENDATIONS

## 2021-04-28 PROCEDURE — ? COUNSELING

## 2021-04-28 PROCEDURE — ? BIOPSY BY SHAVE METHOD

## 2021-04-28 PROCEDURE — 17110 DESTRUCTION B9 LES UP TO 14: CPT

## 2021-04-28 PROCEDURE — ? BENIGN DESTRUCTION

## 2021-04-28 ASSESSMENT — LOCATION DETAILED DESCRIPTION DERM
LOCATION DETAILED: LEFT PROXIMAL POSTERIOR UPPER ARM
LOCATION DETAILED: RIGHT PROXIMAL POSTERIOR UPPER ARM
LOCATION DETAILED: SUPERIOR THORACIC SPINE
LOCATION DETAILED: RIGHT PROXIMAL PRETIBIAL REGION
LOCATION DETAILED: LEFT CENTRAL EYEBROW
LOCATION DETAILED: RIGHT MEDIAL FOREHEAD
LOCATION DETAILED: EPIGASTRIC SKIN
LOCATION DETAILED: LEFT PROXIMAL PRETIBIAL REGION
LOCATION DETAILED: RIGHT SUPERIOR MEDIAL UPPER BACK

## 2021-04-28 ASSESSMENT — LOCATION ZONE DERM
LOCATION ZONE: ARM
LOCATION ZONE: TRUNK
LOCATION ZONE: FACE
LOCATION ZONE: LEG

## 2021-04-28 ASSESSMENT — LOCATION SIMPLE DESCRIPTION DERM
LOCATION SIMPLE: LEFT EYEBROW
LOCATION SIMPLE: RIGHT POSTERIOR UPPER ARM
LOCATION SIMPLE: UPPER BACK
LOCATION SIMPLE: RIGHT UPPER BACK
LOCATION SIMPLE: RIGHT FOREHEAD
LOCATION SIMPLE: RIGHT PRETIBIAL REGION
LOCATION SIMPLE: LEFT POSTERIOR UPPER ARM
LOCATION SIMPLE: ABDOMEN
LOCATION SIMPLE: LEFT PRETIBIAL REGION

## 2021-04-28 NOTE — PROCEDURE: BENIGN DESTRUCTION
Include Z78.9 (Other Specified Conditions Influencing Health Status) As An Associated Diagnosis?: No
Consent: The patient's consent was obtained including but not limited to risks of crusting, scabbing, blistering, scarring, darker or lighter pigmentary change, recurrence, incomplete removal and infection.
Medical Necessity Clause: This procedure was medically necessary because the lesions that were treated were:
Post-Care Instructions: I reviewed with the patient in detail post-care instructions. Patient is to wear sunprotection, and avoid picking at any of the treated lesions. Pt may apply Vaseline to crusted or scabbing areas.
Medical Necessity Information: It is in your best interest to select a reason for this procedure from the list below. All of these items fulfill various CMS LCD requirements except the new and changing color options.
Anesthesia Volume In Cc: 0.5
Detail Level: Detailed
Bill Insurance (You Assume Risk Of Denial Or Audit By Selecting Yes): Yes

## 2021-07-06 ENCOUNTER — OFFICE VISIT (OUTPATIENT)
Dept: FAMILY MEDICINE | Facility: CLINIC | Age: 36
End: 2021-07-06
Payer: COMMERCIAL

## 2021-07-06 VITALS
OXYGEN SATURATION: 98 % | BODY MASS INDEX: 26.52 KG/M2 | TEMPERATURE: 98.5 F | WEIGHT: 189.4 LBS | DIASTOLIC BLOOD PRESSURE: 78 MMHG | HEIGHT: 71 IN | HEART RATE: 78 BPM | SYSTOLIC BLOOD PRESSURE: 110 MMHG

## 2021-07-06 DIAGNOSIS — B08.1 MOLLUSCUM CONTAGIOSUM: ICD-10-CM

## 2021-07-06 DIAGNOSIS — E55.9 VITAMIN D DEFICIENCY: ICD-10-CM

## 2021-07-06 DIAGNOSIS — Z00.00 ENCOUNTER FOR GENERAL ADULT MEDICAL EXAMINATION WITHOUT ABNORMAL FINDINGS: Primary | ICD-10-CM

## 2021-07-06 DIAGNOSIS — L71.8 ACNE ROSACEA, ERYTHEMATOUS TELANGIECTATIC TYPE: ICD-10-CM

## 2021-07-06 DIAGNOSIS — T78.40XA ALLERGY, INITIAL ENCOUNTER: ICD-10-CM

## 2021-07-06 DIAGNOSIS — Z13.220 SCREENING, LIPID: ICD-10-CM

## 2021-07-06 DIAGNOSIS — Z11.3 SCREEN FOR STD (SEXUALLY TRANSMITTED DISEASE): ICD-10-CM

## 2021-07-06 PROCEDURE — 99395 PREV VISIT EST AGE 18-39: CPT | Performed by: FAMILY MEDICINE

## 2021-07-06 PROCEDURE — 3008F BODY MASS INDEX DOCD: CPT | Performed by: FAMILY MEDICINE

## 2021-07-06 RX ORDER — MINERAL OIL
180 ENEMA (ML) RECTAL DAILY
Qty: 90 TABLET | Refills: 1
Start: 2021-07-06 | End: 2022-02-12

## 2021-07-06 NOTE — PATIENT INSTRUCTIONS
Instructions:   1. Schedule with allergist : Dr Adalberto Kingston     2. Go for fasting labs   See first what your out of pocket costs may be     3. Choose healthy foods and exercise   Exercise  goal  is 150 min/wk of aerobic exercise and 2 days per week of flexibility and resistance exercises.           Return to see Dr Salinas in 1 year for a physical

## 2021-07-06 NOTE — PROGRESS NOTES
[facility] Medicine     CHIEF COMPLAINT     Cc:  Annual physical    HISTORY OF PRESENT ILLNESS      This is a 35 y.o. female who presents for annual physical     Pt says she was moving window air conditioner units over the weekend .   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    FAMILY HISTORY      Family History   Problem Relation Age of Onset   • Gout Biological Mother    • Heart attack Maternal Grandfather    • Stomach cancer Paternal Grandmother    • Alzheimer's disease Paternal Grandfather        SOCIAL HISTORY      Social History     Socioeconomic History   • Marital status: Single     Spouse name: Not on file   • Number of children: Not on file   • Years of education: Not on file   • Highest education level: Not on file   Occupational History   • Occupation: Therapist    Tobacco Use   • Smoking status: Never Smoker   • Smokeless tobacco: Never Used   Substance and Sexual Activity   • Alcohol use: Yes     Alcohol/week: 2.0 standard drinks     Types: 1 Glasses of wine, 1 Cans of beer per week     Comment: socially    • Drug use: No   • Sexual activity: Not Currently   Other Topics Concern   • Not  "on file   Social History Narrative   • Not on file     Social Determinants of Health     Financial Resource Strain:    • Difficulty of Paying Living Expenses:    Food Insecurity:    • Worried About Running Out of Food in the Last Year:    • Ran Out of Food in the Last Year:    Transportation Needs:    • Lack of Transportation (Medical):    • Lack of Transportation (Non-Medical):    Physical Activity:    • Days of Exercise per Week:    • Minutes of Exercise per Session:    Stress:    • Feeling of Stress :    Social Connections:    • Frequency of Communication with Friends and Family:    • Frequency of Social Gatherings with Friends and Family:    • Attends Worship Services:    • Active Member of Clubs or Organizations:    • Attends Club or Organization Meetings:    • Marital Status:    Intimate Partner Violence:    • Fear of Current or Ex-Partner:    • Emotionally Abused:    • Physically Abused:    • Sexually Abused:          Reviewed today : allergies/meds/problem list /social hx      REVIEW OF SYSTEMS      .Review of Systems   Constitutional: Negative for chills, fatigue and fever.   HENT: Negative for postnasal drip, sore throat and tinnitus.    Eyes: Negative for pain.   Respiratory: Negative for cough, shortness of breath and wheezing.    Cardiovascular: Negative for chest pain, palpitations and leg swelling.   Gastrointestinal: Negative for diarrhea, nausea and vomiting.   Skin:        R arm bruise    Small skin lesions on fingers and hands    Neurological: Negative for dizziness and headaches.       PHYSICAL EXAMINATION      Visit Vitals  /78 (BP Location: Left upper arm, Patient Position: Sitting)   Pulse 78   Temp 36.9 °C (98.5 °F)   Ht 1.803 m (5' 11\")   Wt 85.9 kg (189 lb 6.4 oz)   SpO2 98%   BMI 26.42 kg/m²     Body mass index is 26.42 kg/m².    Physical Exam  Constitutional:       General: She is not in acute distress.     Appearance: She is well-developed. She is not diaphoretic.   HENT:      " Right Ear: Tympanic membrane, ear canal and external ear normal. There is no impacted cerumen.      Left Ear: Tympanic membrane, ear canal and external ear normal. There is no impacted cerumen.      Nose: Nose normal.   Eyes:      General: No scleral icterus.        Right eye: No discharge.         Left eye: No discharge.      Conjunctiva/sclera: Conjunctivae normal.   Neck:      Vascular: No carotid bruit.   Cardiovascular:      Rate and Rhythm: Normal rate and regular rhythm.      Heart sounds: Normal heart sounds. No murmur heard.   No gallop.    Pulmonary:      Effort: Pulmonary effort is normal. No respiratory distress.      Breath sounds: Normal breath sounds. No wheezing, rhonchi or rales.   Abdominal:      Palpations: Abdomen is soft. There is no mass.      Tenderness: There is no abdominal tenderness. There is no guarding.   Musculoskeletal:      Cervical back: Neck supple.      Right lower leg: No edema.      Left lower leg: No edema.   Skin:     Findings: Bruising (R ventral mid arm) present.   Neurological:      General: No focal deficit present.      Mental Status: She is alert.   Psychiatric:         Mood and Affect: Mood normal.           LABS / IMAGING / STUDIES        Labs  Lab Results   Component Value Date    CREATININE 0.67 08/07/2019     Lab Results   Component Value Date    WBC 4.9 08/07/2019    HGB 12.8 08/07/2019    HCT 38.8 08/07/2019    MCV 85 08/07/2019     08/07/2019         No results found for: HGBA1C  No results found for: MICROALBUR, [id number]      HEALTH MAINTENANCE         PAP /HPV cotesting :  utd [facility]      TDAP: due in 2027  Flu: due in the fall     Covid -19 : utd , fully vaccinated . Pfizer       Retinal eye exam : utd .   Dental : utd       ASSESSMENT AND PLAN   Problem List Items Addressed This Visit        Infectious/Inflammatory    Molluscum contagiosum       Other    Acne rosacea, erythematous telangiectatic type      Other Visit Diagnoses     Encounter for  general adult medical examination without abnormal findings    -  Primary    Relevant Orders    CBC and differential    Comprehensive metabolic panel    Lipid panel    TSH w reflex FT4    Vitamin D 25 hydroxy    Vitamin D deficiency        Relevant Orders    Vitamin D 25 hydroxy    Allergy, initial encounter        Relevant Orders    Ambulatory referral to Allergy    Screening, lipid        Relevant Orders    Lipid panel    Screen for STD (sexually transmitted disease)        Relevant Orders    HIV 1,2 AB P24 AG      Assessment/plan :      healthy goals:   Advise   Exercise of 150 min/wk of aerobic exercise and 2 days per week of flexibility and resistance exercises.   Encourage  healthy dietary choices  including small food portions and exercise.     Advise patient to complete regular dental examinations, brushing and flossing daily.  Encouraged patient to wear seat belts, check smoke and CO detectors .  Encouraged pt to obtain  adequate  7 hrs sleep every night   Counseled pt to practice safe sex  to prevent  STI  s.   Counseled general information for prevention of  alcohol and other substance abuse .    reviewed health maintenance including immunizations today . See under HM above for recommendations .       Pt to see if her insurance will cover referrals and labs    Allergies :  Continue fexofenadine 180 mg daily    Refer Dr Adalberto Kingston , pt is interested in allergy testing    Ordered fasting lab     RTO 1 year physical              Marilee Salinas, DO  07/07/21

## 2021-07-07 PROBLEM — B08.1 MOLLUSCUM CONTAGIOSUM: Status: ACTIVE | Noted: 2021-07-07

## 2021-07-07 ASSESSMENT — ENCOUNTER SYMPTOMS
HEADACHES: 0
FATIGUE: 0
NAUSEA: 0
FEVER: 0
SORE THROAT: 0
SHORTNESS OF BREATH: 0
COUGH: 0
WHEEZING: 0
VOMITING: 0
EYE PAIN: 0
CHILLS: 0
DIZZINESS: 0
DIARRHEA: 0
PALPITATIONS: 0

## 2021-07-22 ENCOUNTER — OFFICE VISIT (OUTPATIENT)
Dept: FAMILY MEDICINE | Facility: CLINIC | Age: 36
End: 2021-07-22
Payer: COMMERCIAL

## 2021-07-22 VITALS
HEART RATE: 77 BPM | DIASTOLIC BLOOD PRESSURE: 70 MMHG | BODY MASS INDEX: 26.47 KG/M2 | SYSTOLIC BLOOD PRESSURE: 110 MMHG | WEIGHT: 189.8 LBS | OXYGEN SATURATION: 98 % | TEMPERATURE: 97.7 F

## 2021-07-22 DIAGNOSIS — Z91.89 RISK OF EXPOSURE TO LYME DISEASE: Primary | ICD-10-CM

## 2021-07-22 PROCEDURE — 99213 OFFICE O/P EST LOW 20 MIN: CPT | Mod: GC | Performed by: STUDENT IN AN ORGANIZED HEALTH CARE EDUCATION/TRAINING PROGRAM

## 2021-07-22 PROCEDURE — 3008F BODY MASS INDEX DOCD: CPT | Performed by: STUDENT IN AN ORGANIZED HEALTH CARE EDUCATION/TRAINING PROGRAM

## 2021-07-22 ASSESSMENT — ENCOUNTER SYMPTOMS
CONSTIPATION: 0
DYSURIA: 0
WHEEZING: 0
SORE THROAT: 0
VOMITING: 0
ARTHRALGIAS: 0
FATIGUE: 0
COUGH: 0
ABDOMINAL PAIN: 0
EYE PAIN: 0
HEMATURIA: 0
FEVER: 0
WOUND: 0
NAUSEA: 0
SEIZURES: 0
NERVOUS/ANXIOUS: 0
APPETITE CHANGE: 0
DIFFICULTY URINATING: 0
DIZZINESS: 0
SHORTNESS OF BREATH: 0
RHINORRHEA: 0
PALPITATIONS: 0
DIARRHEA: 0

## 2021-07-22 NOTE — PROGRESS NOTES
I participated in an office visit   as a supervising  physician with the Resident and  Patient.   I reviewed and participated in the history and physical exam  and discussed the management of all critical elements of the visit with the patient and resident. I reviewed the resident's note and agree with the documented findings and plan of care, except for my comments below or within the additional notes today.

## 2021-07-22 NOTE — ASSESSMENT & PLAN NOTE
Unclear etiology.  C/f lyme given location geographically, summer month, and appearance of rash.  However not classical in appearance, location, or timing.  Can trial cortisone on rash in interim.  Will test and treat if meets criteria.

## 2021-07-22 NOTE — PROGRESS NOTES
"     [facility]     CHIEF COMPLAINT   Naomi Lopez is a 35 y.o. female who presents to the office for eval of bug bite.     HISTORY OF PRESENT ILLNESS      Pt presents for eval of of \"bug bite\" on medial lower LLE x 2mo.  When she first noticed it, it appeared to be a molluscum lesion, which pt has a history of.  She started applying tea tree oil, after which it became erythematous.  She stopped using the tea tree oil approx 3-4 days ago, and now notices a ring surrounding the lesion.  She has been applying bandages to the area, with adhesive that line up with this ring.  States lesion was initially pruritic.  Denies discharge.      Admits to mild headache and some thoracic pain but otherwise denies complaints.    Admits to frequent outdoor exposure, as she frequents the [employer] often.    PAST MEDICAL AND SURGICAL HISTORY      PMHx:  Patient Active Problem List    Diagnosis Date Noted   • Risk of exposure to Lyme disease 07/22/2021   • Molluscum contagiosum 07/07/2021   • Anxiety 10/25/2018   • Acne rosacea, erythematous telangiectatic type 01/30/2017     PSHx:  Past Surgical History:   Procedure Laterality Date   • ORIF HUMERUS FRACTURE Right 1991     MEDICATIONS        Current Outpatient Medications:   •  fexofenadine (ALLEGRA) 180 mg tablet, Take 1 tablet (180 mg total) by mouth daily., Disp: 90 tablet, Rfl: 1     ALLERGIES      Patient has no known allergies.  FAMILY HISTORY      Family History   Problem Relation Age of Onset   • Gout Biological Mother    • Heart attack Maternal Grandfather    • Stomach cancer Paternal Grandmother    • Alzheimer's disease Paternal Grandfather      SOCIAL HISTORY      Social History     Socioeconomic History   • Marital status: Single     Spouse name: Not on file   • Number of children: Not on file   • Years of education: Not on file   • Highest education level: Not on file   Occupational History   • Occupation: Therapist    Tobacco Use   • Smoking " status: Never Smoker   • Smokeless tobacco: Never Used   Substance and Sexual Activity   • Alcohol use: Yes     Alcohol/week: 2.0 standard drinks     Types: 1 Glasses of wine, 1 Cans of beer per week     Comment: socially    • Drug use: No   • Sexual activity: Not Currently   Other Topics Concern   • Not on file   Social History Narrative   • Not on file     Social Determinants of Health     Financial Resource Strain:    • Difficulty of Paying Living Expenses:    Food Insecurity:    • Worried About Running Out of Food in the Last Year:    • Ran Out of Food in the Last Year:    Transportation Needs:    • Lack of Transportation (Medical):    • Lack of Transportation (Non-Medical):    Physical Activity:    • Days of Exercise per Week:    • Minutes of Exercise per Session:    Stress:    • Feeling of Stress :    Social Connections:    • Frequency of Communication with Friends and Family:    • Frequency of Social Gatherings with Friends and Family:    • Attends [employer] Services:    • Active Member of Clubs or Organizations:    • Attends Club or Organization Meetings:    • Marital Status:    Intimate Partner Violence:    • Fear of Current or Ex-Partner:    • Emotionally Abused:    • Physically Abused:    • Sexually Abused:      REVIEW OF SYSTEMS      .Review of Systems   Constitutional: Negative for appetite change, fatigue and fever.   HENT: Negative for congestion, hearing loss, rhinorrhea and sore throat.    Eyes: Negative for pain and visual disturbance.   Respiratory: Negative for cough, shortness of breath and wheezing.    Cardiovascular: Negative for chest pain, palpitations and leg swelling.   Gastrointestinal: Negative for abdominal pain, constipation, diarrhea, nausea and vomiting.   Genitourinary: Negative for difficulty urinating, dysuria and hematuria.   Musculoskeletal: Negative for arthralgias.   Skin: Positive for rash. Negative for wound.   Neurological: Negative for dizziness, seizures and syncope.    Psychiatric/Behavioral: Negative for behavioral problems. The patient is not nervous/anxious.      PHYSICAL EXAMINATION      Visit Vitals  /70   Pulse 77   Temp 36.5 °C (97.7 °F)   Wt 86.1 kg (189 lb 12.8 oz)   SpO2 98%   BMI 26.47 kg/m²     Body mass index is 26.47 kg/m².    Physical Exam  Constitutional:       General: She is not in acute distress.     Appearance: She is well-developed. She is not diaphoretic.   HENT:      Head: Normocephalic and atraumatic.   Eyes:      Pupils: Pupils are equal, round, and reactive to light.   Cardiovascular:      Rate and Rhythm: Normal rate and regular rhythm.      Heart sounds: Normal heart sounds. No murmur heard.   No friction rub. No gallop.    Pulmonary:      Effort: Pulmonary effort is normal. No respiratory distress.      Breath sounds: Normal breath sounds. No wheezing or rales.   Abdominal:      General: Bowel sounds are normal. There is no distension.      Palpations: Abdomen is soft.      Tenderness: There is no abdominal tenderness. There is no guarding or rebound.   Musculoskeletal:         General: No deformity. Normal range of motion.      Cervical back: Normal range of motion and neck supple.      Right lower leg: No edema.      Left lower leg: No edema.   Skin:     General: Skin is warm and dry.      Coloration: Skin is not jaundiced.      Findings: Rash (erythematous macule w/ central clearing and pericentral erythema.  Slightly scaly) present.   Neurological:      Mental Status: She is alert and oriented to person, place, and time.   Psychiatric:         Mood and Affect: Mood normal.         Behavior: Behavior normal.           LABS / IMAGING / STUDIES      Lab Results   Component Value Date    CREATININE 0.67 08/07/2019    CREATININE 0.65 08/29/2018     Lab Results   Component Value Date    WBC 4.9 08/07/2019    HGB 12.8 08/07/2019    HCT 38.8 08/07/2019    MCV 85 08/07/2019     08/07/2019     Lab Results   Component Value Date    CHOL 149  08/07/2019    TRIG 54 08/07/2019    HDL 54 08/07/2019     No results found for: HGBA1C  No results found for: JONG MAJANO        [facility]   ...   ASSESSMENT AND PLAN   Problem List Items Addressed This Visit        Other    Risk of exposure to Lyme disease - Primary     Unclear etiology.  C/f lyme given location geographically, summer month, and appearance of rash.  However not classical in appearance, location, or timing.  Can trial cortisone on rash in interim.  Will test and treat if meets criteria.         Relevant Orders    Lyme Disease Antibodies (IgG, IgM),               Luis Felipe Mosley,   7/22/2021        Some or all of this note has been written using voice recognition software.    Please excuse any typographical errors.

## 2021-07-23 LAB
B BURGDOR IGG PATRN SER IB-IMP: NEGATIVE
B BURGDOR IGM PATRN SER IB-IMP: NEGATIVE
B BURGDOR18KD IGG SER QL IB: NORMAL
B BURGDOR23KD IGG SER QL IB: NORMAL
B BURGDOR23KD IGM SER QL IB: NORMAL
B BURGDOR28KD IGG SER QL IB: NORMAL
B BURGDOR30KD IGG SER QL IB: NORMAL
B BURGDOR39KD IGG SER QL IB: NORMAL
B BURGDOR39KD IGM SER QL IB: NORMAL
B BURGDOR41KD IGG SER QL IB: NORMAL
B BURGDOR41KD IGM SER QL IB: NORMAL
B BURGDOR45KD IGG SER QL IB: NORMAL
B BURGDOR58KD IGG SER QL IB: NORMAL
B BURGDOR66KD IGG SER QL IB: NORMAL
B BURGDOR93KD IGG SER QL IB: NORMAL
HIV 1+2 AB+HIV1 P24 AG SERPL QL IA: NON REACTIVE

## 2021-09-15 ENCOUNTER — OFFICE VISIT (OUTPATIENT)
Dept: FAMILY MEDICINE | Facility: CLINIC | Age: 36
End: 2021-09-15
Payer: COMMERCIAL

## 2021-09-15 VITALS — HEART RATE: 83 BPM | OXYGEN SATURATION: 98 % | BODY MASS INDEX: 27.53 KG/M2 | WEIGHT: 197.4 LBS | TEMPERATURE: 97.7 F

## 2021-09-15 DIAGNOSIS — S96.911A STRAIN OF RIGHT ANKLE, INITIAL ENCOUNTER: ICD-10-CM

## 2021-09-15 DIAGNOSIS — M25.571 ACUTE RIGHT ANKLE PAIN: Primary | ICD-10-CM

## 2021-09-15 PROCEDURE — 99213 OFFICE O/P EST LOW 20 MIN: CPT | Performed by: FAMILY MEDICINE

## 2021-09-15 PROCEDURE — 3008F BODY MASS INDEX DOCD: CPT | Performed by: FAMILY MEDICINE

## 2021-09-15 ASSESSMENT — ENCOUNTER SYMPTOMS
NUMBNESS: 0
CHILLS: 0
WEAKNESS: 0
FEVER: 0
EYE PAIN: 0
ARTHRALGIAS: 1
JOINT SWELLING: 1
FATIGUE: 0

## 2021-09-15 NOTE — PROGRESS NOTES
[facility] Medicine     CHIEF COMPLAINT     CC: follow up to Urgent care visit    HISTORY OF PRESENT ILLNESS      This is a 36 y.o. female who presents for  Follow up to  visit in [facility]   For R ankle swelling .Pt experienced an   Inversion injury when she stumbled , spun around and fell. Her R  Ankle felt unstable   Pt has some discoloration however did not notice bruising until  several days later.  Discoloration is on the  top of her  foot     Iced it, elevated ankle and took ibuprofen and tylenol for pain first and is wearing a  Half cam boot provided by      Feeling better. Less swelling however still present   May have been grade 1or 2  sprain .    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    FAMILY HISTORY      Family History   Problem Relation Age of Onset   • Gout Biological Mother    • Heart attack Maternal Grandfather    • Stomach cancer Paternal Grandmother    • Alzheimer's disease Paternal Grandfather        SOCIAL HISTORY      Social History     Socioeconomic History   • Marital status: Single     Spouse name: Not on file   • Number of children: Not on file   • Years of education: Not on file   • Highest education level: Not on file   Occupational History   • Occupation: Therapist    Tobacco Use   • Smoking status: Never Smoker   • Smokeless tobacco: Never Used    Substance and Sexual Activity   • Alcohol use: Yes     Alcohol/week: 2.0 standard drinks     Types: 1 Glasses of wine, 1 Cans of beer per week     Comment: socially    • Drug use: No   • Sexual activity: Not Currently   Other Topics Concern   • Not on file   Social History Narrative   • Not on file     Social Determinants of Health     Financial Resource Strain:    • Difficulty of Paying Living Expenses:    Food Insecurity:    • Worried About Running Out of Food in the Last Year:    • Ran Out of Food in the Last Year:    Transportation Needs:    • Lack of Transportation (Medical):    • Lack of Transportation (Non-Medical):    Physical Activity:    • Days of Exercise per Week:    • Minutes of Exercise per Session:    Stress:    • Feeling of Stress :    Social Connections:    • Frequency of Communication with Friends and Family:    • Frequency of Social Gatherings with Friends and Family:    • Attends [employer] Services:    • Active Member of Clubs or Organizations:    • Attends Club or Organization Meetings:    • Marital Status:    Intimate Partner Violence:    • Fear of Current or Ex-Partner:    • Emotionally Abused:    • Physically Abused:    • Sexually Abused:          Reviewed today : allergies/meds/problem list /social hx      REVIEW OF SYSTEMS      .Review of Systems   Constitutional: Negative for chills, fatigue and fever.   Eyes: Negative for pain.   Musculoskeletal: Positive for arthralgias (R ankle) and joint swelling (R ankle).   Neurological: Negative for weakness and numbness.       PHYSICAL EXAMINATION      Visit Vitals  Pulse 83   Temp 36.5 °C (97.7 °F)   Wt 89.5 kg (197 lb 6.4 oz)   SpO2 98%   BMI 27.53 kg/m²     Body mass index is 27.53 kg/m².    Physical Exam  Constitutional:       General: She is not in acute distress.     Appearance: She is well-developed. She is not ill-appearing.   HENT:      Head: Normocephalic.   Eyes:      General: No scleral icterus.        Right eye: No discharge.          Left eye: No discharge.      Conjunctiva/sclera: Conjunctivae normal.   Musculoskeletal:         General: Swelling, tenderness and deformity present.      Comments: R  ankle with +1 swelling at lateral malleolus .   Alittle posterior ankle TTP   Pt able to move ankle in all rom .     Skin:     Findings: Bruising (fading bruise around R lateral foot adn ankle ) present.   Psychiatric:         Behavior: Behavior normal.           LABS / IMAGING / STUDIES        Labs  Lab Results   Component Value Date    CREATININE 0.67 08/07/2019     Lab Results   Component Value Date    WBC 4.9 08/07/2019    HGB 12.8 08/07/2019    HCT 38.8 08/07/2019    MCV 85 08/07/2019     08/07/2019         No results found for: HGBA1C  No results found for: JEANNIE MAJANOHUR      HEALTH MAINTENANCE                ASSESSMENT AND PLAN   Problem List Items Addressed This Visit     None      Visit Diagnoses     Acute right ankle pain    -  Primary    Relevant Orders    Ambulatory referral to Podiatry    Ambulatory referral to Physical Therapy    Strain of right ankle, initial encounter        Relevant Orders    Ambulatory referral to Podiatry    Ambulatory referral to Physical Therapy      Assessment /plan:   R ankle pain / ligament strain and sprain  Take tylenol ES for pain as needed  [facility] Dr Adalberto Pacheco for eval fo R ankle sprain   Continue to ice , elevated , and brace .  Change from cam boot to support ankle brace     Refer for PT 2-3 days /wk for ankle strengthening exercises and tx    RTO prn       Marilee Salinas, DO   09/15/21

## 2021-09-15 NOTE — PATIENT INSTRUCTIONS
Instructions:   1. Continue to ice , elevate , wear an ankle brace     2. Schedule appt with Podiatry , Dr Pacheco and associates     3. Schedule for PT .  [facility]    Return to see Dr Rita blum

## 2022-02-09 ENCOUNTER — OFFICE VISIT (OUTPATIENT)
Dept: FAMILY MEDICINE | Facility: CLINIC | Age: 37
End: 2022-02-09
Payer: COMMERCIAL

## 2022-02-09 VITALS
TEMPERATURE: 97.8 F | WEIGHT: 202 LBS | BODY MASS INDEX: 27.36 KG/M2 | HEART RATE: 83 BPM | OXYGEN SATURATION: 98 % | HEIGHT: 72 IN

## 2022-02-09 DIAGNOSIS — S50.01XA TRAUMATIC ECCHYMOSIS OF RIGHT ELBOW, INITIAL ENCOUNTER: Primary | ICD-10-CM

## 2022-02-09 PROCEDURE — 3008F BODY MASS INDEX DOCD: CPT | Performed by: STUDENT IN AN ORGANIZED HEALTH CARE EDUCATION/TRAINING PROGRAM

## 2022-02-09 PROCEDURE — 99213 OFFICE O/P EST LOW 20 MIN: CPT | Mod: GC | Performed by: STUDENT IN AN ORGANIZED HEALTH CARE EDUCATION/TRAINING PROGRAM

## 2022-02-09 NOTE — PROGRESS NOTES
[facility] Family Medicine     CHIEF COMPLAINT   Naomi Lopez is a 36 y.o. female who presents to the office for evaluation of elbow bruising.     HISTORY OF PRESENT ILLNESS        PMHx anxiety, environmental allergies    Presents for bruising around R elbow.    She donated blood about 1 week ago. At that time she had significant bruising near site of needle extending to medial R elbow.  She additionally has red/bruised area of extensor surface of R forearm that she noticed more recently, in the past couple of days.    She called [facility] when she noticed pain and bruising and was instructed that there could be sme nerve irritation in addition to the bruising which may take several weeks to months to resolve. She has started using warm compresses in the past few days.      PAST MEDICAL AND SURGICAL HISTORY      PMHx:  Patient Active Problem List    Diagnosis Date Noted   • Traumatic ecchymosis of right elbow 02/12/2022   • Risk of exposure to Lyme disease 07/22/2021   • Molluscum contagiosum 07/07/2021   • Anxiety 10/25/2018   • Acne rosacea, erythematous telangiectatic type 01/30/2017     PSHx:  Past Surgical History:   Procedure Laterality Date   • ORIF HUMERUS FRACTURE Right 1991     Reviewed at today's visit with patient.  MEDICATIONS      No current outpatient medications on file.  Reviewed at today's visit with patient.  ALLERGIES      Patient has no known allergies.  Reviewed at today's visit with patient.  FAMILY HISTORY      Family History   Problem Relation Age of Onset   • Gout Biological Mother    • Heart attack Maternal Grandfather    • Stomach cancer Paternal Grandmother    • Alzheimer's disease Paternal Grandfather      Reviewed at today's visit with patient.  SOCIAL HISTORY      Social History     Socioeconomic History   • Marital status: Single     Spouse name: None   • Number of children: None   • Years of education: None   • Highest education level: None   Occupational History   •  Occupation: Therapist    Tobacco Use   • Smoking status: Never Smoker   • Smokeless tobacco: Never Used   Substance and Sexual Activity   • Alcohol use: Yes     Alcohol/week: 2.0 standard drinks     Types: 1 Glasses of wine, 1 Cans of beer per week     Comment: socially    • Drug use: No   • Sexual activity: Not Currently   Other Topics Concern   • None   Social History Narrative   • None     Social Determinants of Health     Financial Resource Strain: Not on file   Food Insecurity: Not on file   Transportation Needs: Not on file   Physical Activity: Not on file   Stress: Not on file   Social Connections: Not on file   Intimate Partner Violence: Not on file   Housing Stability: Not on file     Reviewed at today's visit with patient.  REVIEW OF SYSTEMS      .Review of Systems   Constitutional: Negative for chills and fever.   HENT: Negative for ear pain, hearing loss, sinus pain, sneezing, sore throat and voice change.    Eyes: Negative for pain.   Respiratory: Negative for cough, shortness of breath and wheezing.    Cardiovascular: Negative for chest pain and palpitations.   Gastrointestinal: Negative for abdominal pain, constipation, diarrhea, nausea and vomiting.   Endocrine: Negative for polyphagia and polyuria.   Genitourinary: Negative for dysuria, hematuria, urgency and vaginal discharge.   Musculoskeletal: Negative for back pain.   Skin: Negative for rash.        Bruising of R elbow   Neurological: Negative for dizziness and numbness.     PHYSICAL EXAMINATION      Visit Vitals  Pulse 83   Temp 36.6 °C (97.8 °F)   Ht 1.829 m (6')   Wt 91.6 kg (202 lb)   SpO2 98%   BMI 27.40 kg/m²     Body mass index is 27.4 kg/m².    Physical Exam  Constitutional:       General: She is not in acute distress.     Appearance: She is not ill-appearing, toxic-appearing or diaphoretic.   HENT:      Head: Normocephalic and atraumatic.      Right Ear: Tympanic membrane normal.      Left Ear: Tympanic membrane normal.      Nose: Nose  normal.      Mouth/Throat:      Mouth: Mucous membranes are moist.      Pharynx: Oropharynx is clear.   Eyes:      Extraocular Movements: Extraocular movements intact.      Pupils: Pupils are equal, round, and reactive to light.   Cardiovascular:      Rate and Rhythm: Normal rate and regular rhythm.      Heart sounds: Normal heart sounds. No murmur heard.  Pulmonary:      Effort: Pulmonary effort is normal. No respiratory distress.      Breath sounds: Normal breath sounds. No wheezing, rhonchi or rales.   Abdominal:      General: Bowel sounds are normal. There is no distension.      Tenderness: There is no abdominal tenderness. There is no guarding or rebound.   Musculoskeletal:         General: No swelling. Normal range of motion.      Cervical back: Neck supple. No muscular tenderness.   Lymphadenopathy:      Cervical: No cervical adenopathy.   Skin:     General: Skin is warm and dry.      Findings: Bruising present. No rash.      Comments: ~ 5in of healing bruising of medial R elbow with extension of bruising/erythema to extensor surface. No warmth, swelling, tenderness.   Neurological:      General: No focal deficit present.      Mental Status: She is alert and oriented to person, place, and time.      Motor: No weakness.   Psychiatric:         Mood and Affect: Mood normal.         Behavior: Behavior normal.         Thought Content: Thought content normal.         Judgment: Judgment normal.       LABS / IMAGING / STUDIES      Lab Results   Component Value Date    CREATININE 0.67 08/07/2019     Lab Results   Component Value Date    WBC 4.9 08/07/2019    HGB 12.8 08/07/2019    HCT 38.8 08/07/2019    MCV 85 08/07/2019     08/07/2019     Lab Results   Component Value Date    CHOL 149 08/07/2019    TRIG 54 08/07/2019    HDL 54 08/07/2019     No results found for: HGBA1C  No results found for: MICROALBUR, [id number]     Reviewed at today's visit with patient.     HEALTH MAINTENANCE   ...     ASSESSMENT AND PLAN    Problem List Items Addressed This Visit        Other    Traumatic ecchymosis of right elbow - Primary     No signs of infection, the appearance on extensor surface appears that there may be some dependent tracking of the bruising. She will take a photo today and monitor closely for any spreading or increase in tenderness, warmth. Otherwise, continue warm/cool compresses and elevation.                Damaris Zamarripa DO  02/12/22  8:31 AM

## 2022-02-12 PROBLEM — S50.01XA: Status: ACTIVE | Noted: 2022-02-12

## 2022-02-12 ASSESSMENT — ENCOUNTER SYMPTOMS
VOMITING: 0
EYE PAIN: 0
BACK PAIN: 0
DIZZINESS: 0
FEVER: 0
ABDOMINAL PAIN: 0
POLYPHAGIA: 0
VOICE CHANGE: 0
WHEEZING: 0
DYSURIA: 0
SORE THROAT: 0
DIARRHEA: 0
NUMBNESS: 0
NAUSEA: 0
PALPITATIONS: 0
HEMATURIA: 0
SINUS PAIN: 0
SHORTNESS OF BREATH: 0
COUGH: 0
CHILLS: 0
CONSTIPATION: 0

## 2022-02-12 NOTE — ASSESSMENT & PLAN NOTE
No signs of infection, the appearance on extensor surface appears that there may be some dependent tracking of the bruising. She will take a photo today and monitor closely for any spreading or increase in tenderness, warmth. Otherwise, continue warm/cool compresses and elevation.

## 2022-05-10 ENCOUNTER — TELEMEDICINE (OUTPATIENT)
Dept: URGENT CARE | Facility: CLINIC | Age: 37
End: 2022-05-10
Attending: NURSE PRACTITIONER
Payer: COMMERCIAL

## 2022-05-10 ENCOUNTER — HOSPITAL ENCOUNTER (OUTPATIENT)
Facility: CLINIC | Age: 37
Discharge: HOME | End: 2022-05-10
Attending: FAMILY MEDICINE
Payer: COMMERCIAL

## 2022-05-10 VITALS
RESPIRATION RATE: 16 BRPM | TEMPERATURE: 97.7 F | DIASTOLIC BLOOD PRESSURE: 74 MMHG | HEART RATE: 75 BPM | SYSTOLIC BLOOD PRESSURE: 118 MMHG | OXYGEN SATURATION: 99 %

## 2022-05-10 DIAGNOSIS — N76.0 ACUTE VAGINITIS: Primary | ICD-10-CM

## 2022-05-10 LAB
BILIRUBIN, POC: NEGATIVE
BLOOD URINE, POC: ABNORMAL
CLARITY, POC: CLEAR
COLOR, POC: YELLOW
EXPIRATION DATE: ABNORMAL
EXPIRATION DATE: NORMAL
GLUCOSE URINE, POC: NEGATIVE
KETONES, POC: NEGATIVE
LEUKOCYTE EST, POC: NEGATIVE
Lab: ABNORMAL
Lab: NORMAL
NITRITE, POC: NEGATIVE
PH, POC: 6
POCT MANUFACTURER: ABNORMAL
POCT MANUFACTURER: NORMAL
PREGNANCY TEST URINE, POC: NEGATIVE
PROTEIN, POC: NEGATIVE
SPECIFIC GRAVITY, POC: 1

## 2022-05-10 PROCEDURE — 81025 URINE PREGNANCY TEST: CPT | Performed by: NURSE PRACTITIONER

## 2022-05-10 PROCEDURE — 99213 OFFICE O/P EST LOW 20 MIN: CPT | Performed by: NURSE PRACTITIONER

## 2022-05-10 PROCEDURE — 81002 URINALYSIS NONAUTO W/O SCOPE: CPT | Performed by: NURSE PRACTITIONER

## 2022-05-10 PROCEDURE — 99213 OFFICE O/P EST LOW 20 MIN: CPT | Mod: 95 | Performed by: NURSE PRACTITIONER

## 2022-05-10 RX ORDER — FLUCONAZOLE 150 MG/1
TABLET ORAL
Qty: 2 TABLET | Refills: 0 | Status: SHIPPED | OUTPATIENT
Start: 2022-05-10 | End: 2023-03-02

## 2022-05-10 RX ORDER — CYCLOSPORINE 0.5 MG/ML
EMULSION OPHTHALMIC
COMMUNITY
Start: 2021-05-01

## 2022-05-10 ASSESSMENT — ENCOUNTER SYMPTOMS
BACK PAIN: 0
HEMATURIA: 0
DIARRHEA: 0
VOMITING: 0
FLANK PAIN: 0
ABDOMINAL PAIN: 0
HEMATURIA: 0
NAUSEA: 0
BACK PAIN: 0
DIARRHEA: 0
NAUSEA: 0
CHILLS: 0
APPETITE CHANGE: 0
FREQUENCY: 1
BLOOD IN STOOL: 0
HEADACHES: 0
FEVER: 0
VOMITING: 0
DYSURIA: 1
FATIGUE: 0
CHILLS: 0
DYSURIA: 0
ABDOMINAL PAIN: 0
FREQUENCY: 1
FEVER: 0
FLANK PAIN: 0

## 2022-05-10 NOTE — ED NOTES
Bed: 02  Expected date:   Expected time:   Means of arrival:   Comments:  [facility] Jessica Kemmerling, Chadwick Vega, RTR  05/10/22 [id number]

## 2022-05-10 NOTE — ED PROVIDER NOTES
"Emergency Medicine Note  HPI   HISTORY OF PRESENT ILLNESS     37 y/o female presents with concern for UTI vs vaginitis for a couple days. Had a video visit today and was referred to urgent care for testing. Reports urinary urgency / frequency, decreased urine output, urine vs vaginal odor, burning sensation at rest (not with urination), and one episode of \"white pus\" when wiping. She did have left-sided pelvic pain for a few days which resolved. Denies abnormal bleeding, fevers, chills, N/V/D, flank / back pain or hematuria. Has not tried anything for symptoms. New sexual partner last week. Used a condom. She wasn't necessarily concerned for STIs prior to her video visit. Denies history of UTIs, kidney infections, stones.             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    • Rosacea    • Rosacea    • Rosacea        Past Surgical History:   Procedure Laterality Date   • ORIF HUMERUS FRACTURE Right 1991       Family History   Problem Relation Age of Onset   • Gout Biological Mother    • Heart attack Maternal Grandfather    • Stomach cancer Paternal Grandmother    • Alzheimer's disease Paternal Grandfather        Social History     Tobacco Use   • Smoking status: Never Smoker   • Smokeless tobacco: Never Used   Vaping Use   • Vaping Use: Never used   Substance Use Topics   • Alcohol use: Yes     Alcohol/week: 2.0 standard drinks     Types: 1 Glasses of wine, 1 Cans of beer per week     Comment: socially    • Drug use: No         Review of Systems   REVIEW OF SYSTEMS     Review of Systems   Constitutional: Negative for appetite change, chills, fatigue and fever.   Gastrointestinal: Negative for abdominal pain, " diarrhea, nausea and vomiting.   Genitourinary: Positive for decreased urine volume, dysuria, frequency, urgency and vaginal discharge. Negative for flank pain, hematuria, pelvic pain, vaginal bleeding and vaginal pain.        (+) odor   Musculoskeletal: Negative for back pain.   Neurological: Negative for headaches.         VITALS     ED Vitals    Date/Time Temp Pulse Resp BP SpO2 [facility]   05/10/22 1857 36.5 °C (97.7 °F) 75 16 118/74 99 % JENARO                       Physical Exam   PHYSICAL EXAM     Physical Exam  Vitals and nursing note reviewed.   Constitutional:       General: She is awake. She is not in acute distress.     Appearance: Normal appearance. She is well-developed, well-groomed and overweight. She is not ill-appearing or toxic-appearing.   Cardiovascular:      Rate and Rhythm: Normal rate and regular rhythm.      Heart sounds: Normal heart sounds.   Pulmonary:      Effort: Pulmonary effort is normal.      Breath sounds: Normal breath sounds.   Abdominal:      General: Abdomen is flat. Bowel sounds are normal.      Palpations: Abdomen is soft.      Tenderness: There is no abdominal tenderness. There is no right CVA tenderness, left CVA tenderness or guarding.   Genitourinary:     Comments: Deferred. Pt OK to self-swab  Neurological:      Mental Status: She is alert.   Psychiatric:         Behavior: Behavior is cooperative.           PROCEDURES     Procedures     DATA     Results     None              No orders to display       Scoring tools                                 ED Course & MDM   MDM / ED COURSE / CLINICAL IMPRESSIONS / DISPO     MDM  Number of Diagnoses or Management Options  Acute vaginitis  Diagnosis management comments:     35 y/o female presents with concern for UTI vs vaginitis. Had a video visit today and was referred to urgent care for testing.  Generally well-appearing in NAD. Vitals are normal. NO abdominal pain / CVA tenderness.   Urine dip (+) trace blood. NO leuks / nitrates  HCG  negative  Vaginitis Plus sent to [facility]  I suspect likely vaginitis > UTI and patient in agreement.   Treatment options discussed. Patient agreeable to start treatment for candida and hold on treatment for any other infections until results are finalized.   Strict return precautions given. Patient was given opportunity to ask questions. Patient verbalized understanding and agreeable with plan. See dispo for full care plan.        Clinical Impressions as of 05/10/22 1948   Acute vaginitis              Yumiko Doll, PREMA  05/10/22 1955

## 2022-05-10 NOTE — PROGRESS NOTES
"Verification of Patient Location:  The patient affirms they are currently located in the following state: [place]    Request for Consent:    Audio and Video Encounter   Hello, my name is [facility].  Before we proceed, can you please verify your identification by telling me your full name and date of birth?  Can you tell me who is in the room with you?    You and I are about to have a telemedicine check-in or visit because you have requested it.  This is a live video-conference.  I am a real person, speaking to you in real time.  There is no one else with me on the video-conference.  However, when we use (tweetTV, Spoqa, etc) it is important for you to know that the video-conference may not be secure or private.  I am not recording this conversation and I am asking you not to record it.  This telemedicine visit will be billed to your health insurance or you, if you are self-insured.  You understand you will be responsible for any copayments or coinsurances that apply to your telemedicine visit.  Communication platform used for this encounter:  [employer] Video Visit (with Zoom integration)     Before starting our telemedicine visit, I am required to get your consent for this virtual check-in or visit by telemedicine. Do you consent?      Patient Response to Request for Consent:  Yes    Visit Documentation:    Subjective     Patient ID: Naomi Lopez is a 36 y.o. female.  1985      UTI sxs began yesterday:     Frequency     Decreased output     Vagina feels irritated (internal + external)     Vaginal dc \"white pus\" once     \"Urine smells stronger\"    No hematuria  No back pain  No f/c  No n/v/d    New sexual partner last week  Used a condom          The following have been reviewed and updated as appropriate in this visit:   Allergies  Meds  Problems         Review of Systems   Constitutional: Negative for chills and fever.   Gastrointestinal: Negative for abdominal pain, blood in " "stool, diarrhea, nausea and vomiting.   Genitourinary: Positive for decreased urine volume, frequency, vaginal discharge and vaginal pain. Negative for dyspareunia, dysuria, flank pain, genital sores, hematuria, pelvic pain, urgency and vaginal bleeding.   Musculoskeletal: Negative for back pain.         Assessment/Plan   Diagnoses and all orders for this visit:    Acute vaginitis (Primary)    MDM:  Advised being seen in person ([facility], GYN) to collect specimens.  Too many possibilities to diagnose over video visit.  Would like to check urine cx, gc/ct, bv/trich/candida.  [facility].\"  Explained that if she's seen by us today, we will not collect 2 copays.    May try otc monistat products and push clear fluids in the meantime.  All questions were answered during our video visit tonight.    Time Spent:  I spent 20 minutes on this date of service performing the following activities: obtaining history, documenting and providing counseling and education.  "

## 2022-05-10 NOTE — DISCHARGE INSTRUCTIONS
We will send culture to the lab and call you with the results.  You have opted to start treatment for suspected yeast infection.  Avoid sex until results are finalized.    Thank you for choosing [facility]!

## 2022-05-11 NOTE — ED ATTESTATION NOTE
The patient was evaluated and managed by the nurse practitioner.  I was present in the office and provided direct supervision.  I have reviewed and agree with the diagnosis and treatment plan.  I was avail. For any questions or concerns.       Floyd Holman MD  05/10/22 2019

## 2022-05-13 LAB
A VAGINAE DNA VAG QL NAA+PROBE: NORMAL SCORE
BACTERIA UR CULT: NORMAL
BACTERIA UR CULT: NORMAL
BVAB2 DNA VAG QL NAA+PROBE: NORMAL SCORE
C ALBICANS DNA VAG QL NAA+PROBE: NEGATIVE
C GLABRATA DNA VAG QL NAA+PROBE: NEGATIVE
C TRACH DNA VAG QL NAA+PROBE: NEGATIVE
MEGA1 DNA VAG QL NAA+PROBE: NORMAL SCORE
N GONORRHOEA DNA VAG QL NAA+PROBE: NEGATIVE
T VAGINALIS DNA VAG QL NAA+PROBE: NEGATIVE

## 2022-06-08 NOTE — PROGRESS NOTES
[facility]     CHIEF COMPLAINT   Naomi Lopez is a 36 y.o. female who presents to the office for evaluation of hip and pelvic pain.     HISTORY OF PRESENT ILLNESS          Seen within the last ~1 month at urgent care and gyn for sx of vaginal irritation, discharge, vague pelvic pain.  STI screen at  was negative, thought to potentially be due to condom/lubricant irritation.    She saw her gyn as well, repeat STI testing was negative. Pelvis US wnl.  Discharge has resolved.    Currently with L anterior hip/pelvis discomfort. This is worsened by moving with lifting and lots of steps, driving, sitting for long periods.  Seeing a chiropracter - seems to be helping.  Occasionally occurs on R too.    She is concerned whether this is related to pelvic pathology.      PAST MEDICAL AND SURGICAL HISTORY      PMHx:  Patient Active Problem List    Diagnosis Date Noted   • Strain of left iliopsoas muscle 06/12/2022   • Traumatic ecchymosis of right elbow 02/12/2022   • Risk of exposure to Lyme disease 07/22/2021   • Molluscum contagiosum 07/07/2021   • Anxiety 10/25/2018   • Acne rosacea, erythematous telangiectatic type 01/30/2017     PSHx:  Past Surgical History:   Procedure Laterality Date   • ORIF HUMERUS FRACTURE Right 1991     Reviewed at today's visit with patient.  MEDICATIONS        Current Outpatient Medications:   •  cycloSPORINE (RESTASIS) 0.05 % ophthalmic emulsion, , Disp: , Rfl:   •  fluconazole (DIFLUCAN) 150 mg tablet, Take 1 tablet by mouth today. Take 2nd dose at 72 hours if symptoms persist., Disp: 2 tablet, Rfl: 0  Reviewed at today's visit with patient.  ALLERGIES      Patient has no known allergies.  Reviewed at today's visit with patient.  FAMILY HISTORY      Family History   Problem Relation Age of Onset   • Gout Biological Mother    • Heart attack Maternal Grandfather    • Stomach cancer Paternal Grandmother    • Alzheimer's disease Paternal Grandfather      Reviewed at  today's visit with patient.  SOCIAL HISTORY      Social History     Socioeconomic History   • Marital status: Single     Spouse name: None   • Number of children: None   • Years of education: None   • Highest education level: None   Occupational History   • Occupation: Therapist    Tobacco Use   • Smoking status: Never Smoker   • Smokeless tobacco: Never Used   Vaping Use   • Vaping Use: Never used   Substance and Sexual Activity   • Alcohol use: Yes     Alcohol/week: 2.0 standard drinks     Types: 1 Glasses of wine, 1 Cans of beer per week     Comment: socially    • Drug use: No   • Sexual activity: Not Currently     Reviewed at today's visit with patient.  REVIEW OF SYSTEMS      .Review of Systems   All other systems reviewed and are negative.    PHYSICAL EXAMINATION      Visit Vitals  /82   Pulse 88   Temp 36.5 °C (97.7 °F)   Ht 1.829 m (6')   Wt 89.8 kg (198 lb)   SpO2 99%   BMI 26.85 kg/m²     Body mass index is 26.85 kg/m².    Physical Exam  Constitutional:       General: She is not in acute distress.     Appearance: She is not ill-appearing, toxic-appearing or diaphoretic.   HENT:      Head: Normocephalic and atraumatic.      Right Ear: Tympanic membrane normal.      Left Ear: Tympanic membrane normal.      Nose: Nose normal.      Mouth/Throat:      Mouth: Mucous membranes are moist.      Pharynx: Oropharynx is clear.   Eyes:      Extraocular Movements: Extraocular movements intact.      Pupils: Pupils are equal, round, and reactive to light.   Cardiovascular:      Rate and Rhythm: Normal rate and regular rhythm.      Heart sounds: Normal heart sounds. No murmur heard.  Pulmonary:      Effort: Pulmonary effort is normal. No respiratory distress.      Breath sounds: Normal breath sounds. No wheezing, rhonchi or rales.   Abdominal:      General: Bowel sounds are normal. There is no distension.      Tenderness: There is no abdominal tenderness. There is no guarding or rebound.   Musculoskeletal:          General: Tenderness ( mild, at psoas) present. No swelling.      Cervical back: Neck supple. No muscular tenderness.      Comments: Exacerbated by hip extension   Lymphadenopathy:      Cervical: No cervical adenopathy.   Skin:     General: Skin is warm and dry.      Findings: No rash.   Neurological:      General: No focal deficit present.      Mental Status: She is alert and oriented to person, place, and time.      Motor: No weakness.   Psychiatric:         Mood and Affect: Mood normal.         Behavior: Behavior normal.         Thought Content: Thought content normal.         Judgment: Judgment normal.       LABS / IMAGING / STUDIES      Lab Results   Component Value Date    CREATININE 0.67 08/07/2019     Lab Results   Component Value Date    WBC 4.9 08/07/2019    HGB 12.8 08/07/2019    HCT 38.8 08/07/2019    MCV 85 08/07/2019     08/07/2019     Lab Results   Component Value Date    CHOL 149 08/07/2019    TRIG 54 08/07/2019    HDL 54 08/07/2019     No results found for: HGBA1C  No results found for: JONG MAJANO     Reviewed at today's visit with patient.     HEALTH MAINTENANCE   ...     ASSESSMENT AND PLAN   Problem List Items Addressed This Visit        Musculoskeletal    Strain of left iliopsoas muscle     History and provocation consistent with msk pathology, not gyn.  She will try ibuprofen to help with pain/inflammation, rest, continue gentle stretching and let us know if sx fail to improve.             Other Visit Diagnoses     Routine medical exam    -  Primary    Relevant Orders    CBC and Differential    Comprehensive metabolic panel    Lipid panel    Vitamin D deficiency        Relevant Orders    Vitamin D 25 hydroxy           Damaris Zamarripa DO  06/12/22  5:42 PM

## 2022-06-09 ENCOUNTER — OFFICE VISIT (OUTPATIENT)
Dept: FAMILY MEDICINE | Facility: CLINIC | Age: 37
End: 2022-06-09
Payer: COMMERCIAL

## 2022-06-09 VITALS
TEMPERATURE: 97.7 F | OXYGEN SATURATION: 99 % | HEART RATE: 88 BPM | DIASTOLIC BLOOD PRESSURE: 82 MMHG | HEIGHT: 72 IN | SYSTOLIC BLOOD PRESSURE: 118 MMHG | WEIGHT: 198 LBS | BODY MASS INDEX: 26.82 KG/M2

## 2022-06-09 DIAGNOSIS — S76.812A STRAIN OF LEFT ILIOPSOAS MUSCLE, INITIAL ENCOUNTER: ICD-10-CM

## 2022-06-09 DIAGNOSIS — Z00.00 ROUTINE MEDICAL EXAM: Primary | ICD-10-CM

## 2022-06-09 DIAGNOSIS — E55.9 VITAMIN D DEFICIENCY: ICD-10-CM

## 2022-06-09 PROCEDURE — 99213 OFFICE O/P EST LOW 20 MIN: CPT | Mod: GC | Performed by: STUDENT IN AN ORGANIZED HEALTH CARE EDUCATION/TRAINING PROGRAM

## 2022-06-09 PROCEDURE — 3008F BODY MASS INDEX DOCD: CPT | Performed by: STUDENT IN AN ORGANIZED HEALTH CARE EDUCATION/TRAINING PROGRAM

## 2022-06-12 PROBLEM — S76.812A STRAIN OF LEFT ILIOPSOAS MUSCLE: Status: ACTIVE | Noted: 2022-06-12

## 2022-06-12 NOTE — ASSESSMENT & PLAN NOTE
History and provocation consistent with msk pathology, not gyn.  She will try ibuprofen to help with pain/inflammation, rest, continue gentle stretching and let us know if sx fail to improve.

## 2022-07-06 ENCOUNTER — APPOINTMENT (RX ONLY)
Dept: URBAN - METROPOLITAN AREA CLINIC 28 | Facility: CLINIC | Age: 37
Setting detail: DERMATOLOGY
End: 2022-07-06

## 2022-07-06 DIAGNOSIS — Z87.2 PERSONAL HISTORY OF DISEASES OF THE SKIN AND SUBCUTANEOUS TISSUE: ICD-10-CM

## 2022-07-06 DIAGNOSIS — Z71.89 OTHER SPECIFIED COUNSELING: ICD-10-CM

## 2022-07-06 DIAGNOSIS — D18.0 HEMANGIOMA: ICD-10-CM

## 2022-07-06 DIAGNOSIS — L81.4 OTHER MELANIN HYPERPIGMENTATION: ICD-10-CM

## 2022-07-06 DIAGNOSIS — D22 MELANOCYTIC NEVI: ICD-10-CM

## 2022-07-06 PROBLEM — D22.61 MELANOCYTIC NEVI OF RIGHT UPPER LIMB, INCLUDING SHOULDER: Status: ACTIVE | Noted: 2022-07-06

## 2022-07-06 PROBLEM — D18.01 HEMANGIOMA OF SKIN AND SUBCUTANEOUS TISSUE: Status: ACTIVE | Noted: 2022-07-06

## 2022-07-06 PROBLEM — D22.71 MELANOCYTIC NEVI OF RIGHT LOWER LIMB, INCLUDING HIP: Status: ACTIVE | Noted: 2022-07-06

## 2022-07-06 PROBLEM — D22.62 MELANOCYTIC NEVI OF LEFT UPPER LIMB, INCLUDING SHOULDER: Status: ACTIVE | Noted: 2022-07-06

## 2022-07-06 PROBLEM — D22.5 MELANOCYTIC NEVI OF TRUNK: Status: ACTIVE | Noted: 2022-07-06

## 2022-07-06 PROBLEM — D22.72 MELANOCYTIC NEVI OF LEFT LOWER LIMB, INCLUDING HIP: Status: ACTIVE | Noted: 2022-07-06

## 2022-07-06 PROCEDURE — ? SUNSCREEN RECOMMENDATIONS

## 2022-07-06 PROCEDURE — ? COUNSELING

## 2022-07-06 PROCEDURE — ? FULL BODY SKIN EXAM

## 2022-07-06 PROCEDURE — 99213 OFFICE O/P EST LOW 20 MIN: CPT

## 2022-07-06 ASSESSMENT — LOCATION DETAILED DESCRIPTION DERM
LOCATION DETAILED: RIGHT PROXIMAL PRETIBIAL REGION
LOCATION DETAILED: INFERIOR THORACIC SPINE
LOCATION DETAILED: EPIGASTRIC SKIN
LOCATION DETAILED: RIGHT SUPERIOR MEDIAL UPPER BACK
LOCATION DETAILED: LEFT PROXIMAL PRETIBIAL REGION
LOCATION DETAILED: RIGHT PROXIMAL POSTERIOR UPPER ARM
LOCATION DETAILED: LEFT PROXIMAL POSTERIOR UPPER ARM
LOCATION DETAILED: SUPERIOR THORACIC SPINE

## 2022-07-06 ASSESSMENT — LOCATION SIMPLE DESCRIPTION DERM
LOCATION SIMPLE: ABDOMEN
LOCATION SIMPLE: UPPER BACK
LOCATION SIMPLE: RIGHT POSTERIOR UPPER ARM
LOCATION SIMPLE: LEFT PRETIBIAL REGION
LOCATION SIMPLE: LEFT POSTERIOR UPPER ARM
LOCATION SIMPLE: RIGHT PRETIBIAL REGION
LOCATION SIMPLE: RIGHT UPPER BACK

## 2022-07-06 ASSESSMENT — LOCATION ZONE DERM
LOCATION ZONE: ARM
LOCATION ZONE: TRUNK
LOCATION ZONE: LEG

## 2022-12-05 ENCOUNTER — APPOINTMENT (RX ONLY)
Dept: URBAN - METROPOLITAN AREA CLINIC 374 | Facility: CLINIC | Age: 37
Setting detail: DERMATOLOGY
End: 2022-12-05

## 2022-12-05 DIAGNOSIS — L71.0 PERIORAL DERMATITIS: ICD-10-CM

## 2022-12-05 PROCEDURE — ? PHOTO-DOCUMENTATION

## 2022-12-05 PROCEDURE — ? COUNSELING

## 2022-12-05 PROCEDURE — ? PRESCRIPTION MEDICATION MANAGEMENT

## 2022-12-05 PROCEDURE — 99213 OFFICE O/P EST LOW 20 MIN: CPT

## 2022-12-05 PROCEDURE — ? MEDICATION COUNSELING

## 2022-12-05 PROCEDURE — ? PRESCRIPTION

## 2022-12-05 RX ORDER — METRONIDAZOLE 10 MG/G
1 GEL TOPICAL BID
Qty: 60 | Refills: 2 | Status: ERX | COMMUNITY
Start: 2022-12-05

## 2022-12-05 RX ORDER — DOXYCYCLINE 100 MG/1
1 TABLET, FILM COATED ORAL QDAY
Qty: 30 | Refills: 1 | Status: ERX | COMMUNITY
Start: 2022-12-05

## 2022-12-05 RX ADMIN — METRONIDAZOLE 1: 10 GEL TOPICAL at 00:00

## 2022-12-05 RX ADMIN — DOXYCYCLINE 1: 100 TABLET, FILM COATED ORAL at 00:00

## 2022-12-05 ASSESSMENT — LOCATION DETAILED DESCRIPTION DERM
LOCATION DETAILED: LEFT LATERAL CANTHUS
LOCATION DETAILED: RIGHT UPPER CUTANEOUS LIP

## 2022-12-05 ASSESSMENT — LOCATION ZONE DERM
LOCATION ZONE: LIP
LOCATION ZONE: EYELID

## 2022-12-05 ASSESSMENT — LOCATION SIMPLE DESCRIPTION DERM
LOCATION SIMPLE: LEFT EYELID
LOCATION SIMPLE: RIGHT LIP

## 2022-12-05 NOTE — PROCEDURE: PRESCRIPTION MEDICATION MANAGEMENT
Detail Level: Zone
Initiate Treatment: doxycycline monohydrate 100 mg tablet: Take 1 tab po QDAY with food\\nmetronidazole 1% topical gel: Apply a thin layer to AA of the face BID
Render In Strict Bullet Format?: No

## 2023-01-31 ENCOUNTER — TELEPHONE (OUTPATIENT)
Dept: FAMILY MEDICINE | Facility: CLINIC | Age: 38
End: 2023-01-31
Payer: COMMERCIAL

## 2023-02-27 NOTE — PROGRESS NOTES
[facility]     CHIEF COMPLAINT   Naomi Lopez is a 37 y.o. female who presents to the office for routine physical.     HISTORY OF PRESENT ILLNESS        PMHx anxiety, environmental allergies.    Weigh Management  Gained 11lb since June.  Used to be consistently at 180, then increased to 200 with covid. She has started exercising, but still seeing weight increase a little, unsure if due to muscle vs adipose tissue. She is otherwise feeling in her usual state of health.    Elevated HR  Sees elevated HR on monitor with [employer] class. Does not recall specific number, but sees that it is in the red zone.  No other symptoms during this time including no chest pain, abnormal dyspnea, lightheadedness.  Her heart rate has been within normal range at all of her visits here.    Lost Sense of Smell  Since covid 19 infection.  She feels that her sense of smell only recovered to about 80 or 90% of her previous.  She initially tried smell training, has not tried anything else.    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  MEDICATIONS        Current Outpatient Medications:   •  fluticasone propionate (FLONASE) 50 mcg/actuation nasal spray, Administer 1 spray into each nostril daily., Disp: 16 g, Rfl: 0  •  cycloSPORINE (RESTASIS) 0.05 % ophthalmic emulsion, , Disp: , Rfl:   Reviewed at today's visit with patient.  ALLERGIES      Patient has no known allergies.  Reviewed at today's visit with  "patient.  FAMILY HISTORY      Family History   Problem Relation Age of Onset   • Gout Biological Mother    • Heart attack Maternal Grandfather    • Stomach cancer Paternal Grandmother    • Alzheimer's disease Paternal Grandfather      Reviewed at today's visit with patient.  SOCIAL HISTORY      Social History     Socioeconomic History   • Marital status: Single     Spouse name: None   • Number of children: None   • Years of education: None   • Highest education level: None   Occupational History   • Occupation: Therapist    Tobacco Use   • Smoking status: Never   • Smokeless tobacco: Never   Vaping Use   • Vaping Use: Never used   Substance and Sexual Activity   • Alcohol use: Yes     Alcohol/week: 2.0 standard drinks     Types: 1 Glasses of wine, 1 Cans of beer per week     Comment: socially    • Drug use: No   • Sexual activity: Not Currently     Reviewed at today's visit with patient.  REVIEW OF SYSTEMS      .Review of Systems   All other systems reviewed and are negative.    PHYSICAL EXAMINATION      Visit Vitals  /82   Pulse 85   Temp 36.3 °C (97.4 °F)   Ht 1.803 m (5' 11\")   Wt 94.8 kg (209 lb)   SpO2 98%   BMI 29.15 kg/m²     Body mass index is 29.15 kg/m².    Physical Exam  Constitutional:       General: She is not in acute distress.     Appearance: She is not ill-appearing, toxic-appearing or diaphoretic.   HENT:      Head: Normocephalic and atraumatic.      Right Ear: Tympanic membrane normal.      Left Ear: Tympanic membrane normal.      Nose: Nose normal.      Mouth/Throat:      Mouth: Mucous membranes are moist.      Pharynx: Oropharynx is clear.   Eyes:      Extraocular Movements: Extraocular movements intact.      Pupils: Pupils are equal, round, and reactive to light.   Cardiovascular:      Rate and Rhythm: Normal rate and regular rhythm.      Heart sounds: Normal heart sounds. No murmur heard.  Pulmonary:      Effort: Pulmonary effort is normal. No respiratory distress.      Breath sounds: " Normal breath sounds. No wheezing, rhonchi or rales.   Abdominal:      General: Bowel sounds are normal. There is no distension.      Tenderness: There is no abdominal tenderness. There is no guarding or rebound.   Musculoskeletal:         General: No swelling. Normal range of motion.      Cervical back: Neck supple. No muscular tenderness.   Lymphadenopathy:      Cervical: No cervical adenopathy.   Skin:     General: Skin is warm and dry.      Findings: No bruising or rash.   Neurological:      General: No focal deficit present.      Mental Status: She is alert and oriented to person, place, and time.      Motor: No weakness.   Psychiatric:         Mood and Affect: Mood normal.         Behavior: Behavior normal.         Thought Content: Thought content normal.         Judgment: Judgment normal.       LABS / IMAGING / STUDIES      Lab Results   Component Value Date    CREATININE 0.67 08/07/2019     Lab Results   Component Value Date    WBC 4.9 08/07/2019    HGB 12.8 08/07/2019    HCT 38.8 08/07/2019    MCV 85 08/07/2019     08/07/2019     Lab Results   Component Value Date    CHOL 149 08/07/2019    TRIG 54 08/07/2019    HDL 54 08/07/2019     No results found for: HGBA1C  No results found for: MICROALANCA, [id number]     Reviewed at today's visit with patient.     HEALTH MAINTENANCE   ...     ASSESSMENT AND PLAN   Problem List Items Addressed This Visit        Endocrine/Metabolic    Weight gain     Gradual, no alarm symptoms.  She has identified diet will be the next place for her to work on.  Already active with fitness classes.  Offered nutrition referral, declined.  She feels that she has a good idea of the changes that she will make.  Follow-up TSH.         Relevant Orders    CBC and Differential    Comprehensive metabolic panel    Lipid panel    TSH w reflex FT4       Mental Health    Anxiety     Controlled, no SI, HI.            Other    Routine medical exam - Primary     Aim for 150 minutes of  exercise/week. Choose nutritious fresh foods and healthy fats and proteins, avoid exessive sweets/carbs.    Discussed today that elevated heart rate is a normal physiological response to exercise.  Heart rate here is appropriate.  She will pay attention to the number that she sees at work out and any associated symptoms.  Will let us know if the number is very high.         Relevant Orders    CBC and Differential    Comprehensive metabolic panel    Lipid panel   Other Visit Diagnoses     Screening, lipid        Relevant Orders    Lipid panel           Damaris Zamarripa DO  03/02/23  3:51 PM

## 2023-02-28 ENCOUNTER — TELEPHONE (OUTPATIENT)
Dept: FAMILY MEDICINE | Facility: CLINIC | Age: 38
End: 2023-02-28

## 2023-03-01 ENCOUNTER — TELEPHONE (OUTPATIENT)
Dept: FAMILY MEDICINE | Facility: CLINIC | Age: 38
End: 2023-03-01

## 2023-03-02 ENCOUNTER — OFFICE VISIT (OUTPATIENT)
Dept: FAMILY MEDICINE | Facility: CLINIC | Age: 38
End: 2023-03-02
Payer: COMMERCIAL

## 2023-03-02 VITALS
DIASTOLIC BLOOD PRESSURE: 82 MMHG | BODY MASS INDEX: 29.26 KG/M2 | HEIGHT: 71 IN | HEART RATE: 85 BPM | WEIGHT: 209 LBS | OXYGEN SATURATION: 98 % | SYSTOLIC BLOOD PRESSURE: 105 MMHG | TEMPERATURE: 97.4 F

## 2023-03-02 DIAGNOSIS — R63.5 WEIGHT GAIN: ICD-10-CM

## 2023-03-02 DIAGNOSIS — Z00.00 ROUTINE MEDICAL EXAM: Primary | ICD-10-CM

## 2023-03-02 DIAGNOSIS — Z13.220 SCREENING, LIPID: ICD-10-CM

## 2023-03-02 DIAGNOSIS — F41.9 ANXIETY: ICD-10-CM

## 2023-03-02 PROBLEM — B08.1 MOLLUSCUM CONTAGIOSUM: Status: RESOLVED | Noted: 2021-07-07 | Resolved: 2023-03-02

## 2023-03-02 PROBLEM — S76.812A STRAIN OF LEFT ILIOPSOAS MUSCLE: Status: RESOLVED | Noted: 2022-06-12 | Resolved: 2023-03-02

## 2023-03-02 PROBLEM — S50.01XA: Status: RESOLVED | Noted: 2022-02-12 | Resolved: 2023-03-02

## 2023-03-02 PROBLEM — Z91.89 RISK OF EXPOSURE TO LYME DISEASE: Status: RESOLVED | Noted: 2021-07-22 | Resolved: 2023-03-02

## 2023-03-02 PROBLEM — R43.0 POST-COVID CHRONIC LOSS OF SMELL: Status: ACTIVE | Noted: 2023-03-02

## 2023-03-02 PROBLEM — U09.9 POST-COVID CHRONIC LOSS OF SMELL: Status: ACTIVE | Noted: 2023-03-02

## 2023-03-02 PROCEDURE — 99395 PREV VISIT EST AGE 18-39: CPT | Mod: GC | Performed by: STUDENT IN AN ORGANIZED HEALTH CARE EDUCATION/TRAINING PROGRAM

## 2023-03-02 PROCEDURE — 3008F BODY MASS INDEX DOCD: CPT | Performed by: STUDENT IN AN ORGANIZED HEALTH CARE EDUCATION/TRAINING PROGRAM

## 2023-03-02 RX ORDER — FLUTICASONE PROPIONATE 50 MCG
1 SPRAY, SUSPENSION (ML) NASAL DAILY
Qty: 16 G | Refills: 0 | Status: SHIPPED | OUTPATIENT
Start: 2023-03-02 | End: 2023-03-23 | Stop reason: SDUPTHER

## 2023-03-02 NOTE — ASSESSMENT & PLAN NOTE
Gradual, no alarm symptoms.  She has identified diet will be the next place for her to work on.  Already active with fitness classes.  Offered nutrition referral, declined.  She feels that she has a good idea of the changes that she will make.  Follow-up TSH.

## 2023-03-02 NOTE — ASSESSMENT & PLAN NOTE
Aim for 150 minutes of exercise/week. Choose nutritious fresh foods and healthy fats and proteins, avoid exessive sweets/carbs.    Discussed today that elevated heart rate is a normal physiological response to exercise.  Heart rate here is appropriate.  She will pay attention to the number that she sees at work out and any associated symptoms.  Will let us know if the number is very high.

## 2023-03-03 NOTE — PROGRESS NOTES
I participated   as a supervising  physician with the Resident and Patient.   I reviewed  the patient's history and  resident's note . I  agree that all critical elements of the visit were discussed and addressed by the Resident.and agree with the documented findings and plan of care, except for my comments below or within the additional notes today.

## 2023-03-18 LAB
ALBUMIN SERPL-MCNC: 4.7 G/DL (ref 3.8–4.8)
ALBUMIN/GLOB SERPL: 2 {RATIO} (ref 1.2–2.2)
ALP SERPL-CCNC: 49 IU/L (ref 44–121)
ALT SERPL-CCNC: 13 IU/L (ref 0–32)
AST SERPL-CCNC: 19 IU/L (ref 0–40)
BASOPHILS # BLD AUTO: 0 X10E3/UL (ref 0–0.2)
BASOPHILS NFR BLD AUTO: 1 %
BILIRUB SERPL-MCNC: 0.8 MG/DL (ref 0–1.2)
BUN SERPL-MCNC: 10 MG/DL (ref 6–20)
BUN/CREAT SERPL: 16 (ref 9–23)
CALCIUM SERPL-MCNC: 9.2 MG/DL (ref 8.7–10.2)
CHLORIDE SERPL-SCNC: 102 MMOL/L (ref 96–106)
CHOLEST SERPL-MCNC: 177 MG/DL (ref 100–199)
CO2 SERPL-SCNC: 17 MMOL/L (ref 20–29)
CREAT SERPL-MCNC: 0.63 MG/DL (ref 0.57–1)
EGFRCR SERPLBLD CKD-EPI 2021: 117 ML/MIN/1.73
EOSINOPHIL # BLD AUTO: 0.1 X10E3/UL (ref 0–0.4)
EOSINOPHIL NFR BLD AUTO: 2 %
ERYTHROCYTE [DISTWIDTH] IN BLOOD BY AUTOMATED COUNT: 13.7 % (ref 11.7–15.4)
GLOBULIN SER CALC-MCNC: 2.4 G/DL (ref 1.5–4.5)
GLUCOSE SERPL-MCNC: 127 MG/DL (ref 70–99)
HCT VFR BLD AUTO: 40 % (ref 34–46.6)
HDLC SERPL-MCNC: 49 MG/DL
HGB BLD-MCNC: 13.1 G/DL (ref 11.1–15.9)
IMM GRANULOCYTES # BLD AUTO: 0 X10E3/UL (ref 0–0.1)
IMM GRANULOCYTES NFR BLD AUTO: 0 %
LDLC SERPL CALC-MCNC: 102 MG/DL (ref 0–99)
LYMPHOCYTES # BLD AUTO: 1.6 X10E3/UL (ref 0.7–3.1)
LYMPHOCYTES NFR BLD AUTO: 27 %
MCH RBC QN AUTO: 27.6 PG (ref 26.6–33)
MCHC RBC AUTO-ENTMCNC: 32.8 G/DL (ref 31.5–35.7)
MCV RBC AUTO: 84 FL (ref 79–97)
MONOCYTES # BLD AUTO: 0.4 X10E3/UL (ref 0.1–0.9)
MONOCYTES NFR BLD AUTO: 6 %
NEUTROPHILS # BLD AUTO: 3.8 X10E3/UL (ref 1.4–7)
NEUTROPHILS NFR BLD AUTO: 64 %
PLATELET # BLD AUTO: 250 X10E3/UL (ref 150–450)
POTASSIUM SERPL-SCNC: 4 MMOL/L (ref 3.5–5.2)
PROT SERPL-MCNC: 7.1 G/DL (ref 6–8.5)
RBC # BLD AUTO: 4.75 X10E6/UL (ref 3.77–5.28)
SODIUM SERPL-SCNC: 137 MMOL/L (ref 134–144)
T4 FREE SERPL-MCNC: 1.17 NG/DL (ref 0.82–1.77)
TRIGL SERPL-MCNC: 148 MG/DL (ref 0–149)
TSH SERPL DL<=0.005 MIU/L-ACNC: 1.88 UIU/ML (ref 0.45–4.5)
VLDLC SERPL CALC-MCNC: 26 MG/DL (ref 5–40)
WBC # BLD AUTO: 5.8 X10E3/UL (ref 3.4–10.8)

## 2023-03-23 RX ORDER — FLUTICASONE PROPIONATE 50 MCG
1 SPRAY, SUSPENSION (ML) NASAL DAILY
Qty: 24 G | Refills: 3 | Status: SHIPPED | OUTPATIENT
Start: 2023-03-23 | End: 2023-09-06

## 2023-04-18 ENCOUNTER — APPOINTMENT (RX ONLY)
Dept: URBAN - METROPOLITAN AREA CLINIC 374 | Facility: CLINIC | Age: 38
Setting detail: DERMATOLOGY
End: 2023-04-18

## 2023-04-18 DIAGNOSIS — L71.0 PERIORAL DERMATITIS: ICD-10-CM | Status: RESOLVED

## 2023-04-18 PROCEDURE — ? PRESCRIPTION MEDICATION MANAGEMENT

## 2023-04-18 PROCEDURE — 99213 OFFICE O/P EST LOW 20 MIN: CPT

## 2023-04-18 PROCEDURE — ? PRESCRIPTION

## 2023-04-18 RX ORDER — DOXYCYCLINE 100 MG/1
1 TABLET, FILM COATED ORAL QDAY
Qty: 30 | Refills: 1 | Status: ERX

## 2023-04-18 RX ORDER — METRONIDAZOLE 10 MG/G
1 GEL TOPICAL BID
Qty: 60 | Refills: 2 | Status: ERX | COMMUNITY
Start: 2023-04-18

## 2023-04-18 RX ADMIN — METRONIDAZOLE 1: 10 GEL TOPICAL at 00:00

## 2023-04-18 ASSESSMENT — LOCATION SIMPLE DESCRIPTION DERM
LOCATION SIMPLE: CHIN
LOCATION SIMPLE: LEFT CHEEK

## 2023-04-18 ASSESSMENT — LOCATION ZONE DERM: LOCATION ZONE: FACE

## 2023-04-18 ASSESSMENT — LOCATION DETAILED DESCRIPTION DERM
LOCATION DETAILED: RIGHT CHIN
LOCATION DETAILED: LEFT SUPERIOR CENTRAL MALAR CHEEK

## 2023-04-18 NOTE — PROCEDURE: PRESCRIPTION MEDICATION MANAGEMENT
Detail Level: Zone
Discontinue Regimen: doxycycline monohydrate 100 mg tablet (may restart PRN flare)
Initiate Treatment: metronidazole 1% topical gel: Apply a thin layer to AA of the face BID (patient did not get medication, but now has new insurance)
Render In Strict Bullet Format?: No

## 2023-06-27 ENCOUNTER — APPOINTMENT (RX ONLY)
Dept: URBAN - METROPOLITAN AREA CLINIC 28 | Facility: CLINIC | Age: 38
Setting detail: DERMATOLOGY
End: 2023-06-27

## 2023-06-27 DIAGNOSIS — D22 MELANOCYTIC NEVI: ICD-10-CM

## 2023-06-27 DIAGNOSIS — D18.0 HEMANGIOMA: ICD-10-CM

## 2023-06-27 DIAGNOSIS — Z71.89 OTHER SPECIFIED COUNSELING: ICD-10-CM

## 2023-06-27 PROBLEM — D22.71 MELANOCYTIC NEVI OF RIGHT LOWER LIMB, INCLUDING HIP: Status: ACTIVE | Noted: 2023-06-27

## 2023-06-27 PROBLEM — D18.01 HEMANGIOMA OF SKIN AND SUBCUTANEOUS TISSUE: Status: ACTIVE | Noted: 2023-06-27

## 2023-06-27 PROBLEM — D22.72 MELANOCYTIC NEVI OF LEFT LOWER LIMB, INCLUDING HIP: Status: ACTIVE | Noted: 2023-06-27

## 2023-06-27 PROBLEM — D22.62 MELANOCYTIC NEVI OF LEFT UPPER LIMB, INCLUDING SHOULDER: Status: ACTIVE | Noted: 2023-06-27

## 2023-06-27 PROBLEM — D22.5 MELANOCYTIC NEVI OF TRUNK: Status: ACTIVE | Noted: 2023-06-27

## 2023-06-27 PROBLEM — D22.61 MELANOCYTIC NEVI OF RIGHT UPPER LIMB, INCLUDING SHOULDER: Status: ACTIVE | Noted: 2023-06-27

## 2023-06-27 PROCEDURE — ? SUNSCREEN RECOMMENDATIONS

## 2023-06-27 PROCEDURE — ? SHAVE REMOVAL

## 2023-06-27 PROCEDURE — 11300 SHAVE SKIN LESION 0.5 CM/<: CPT

## 2023-06-27 PROCEDURE — 99213 OFFICE O/P EST LOW 20 MIN: CPT | Mod: 25

## 2023-06-27 PROCEDURE — ? FULL BODY SKIN EXAM

## 2023-06-27 PROCEDURE — ? COUNSELING

## 2023-06-27 ASSESSMENT — LOCATION ZONE DERM
LOCATION ZONE: TRUNK
LOCATION ZONE: LEG
LOCATION ZONE: ARM

## 2023-06-27 ASSESSMENT — LOCATION DETAILED DESCRIPTION DERM
LOCATION DETAILED: LEFT ANTERIOR PROXIMAL UPPER ARM
LOCATION DETAILED: LEFT ANTERIOR PROXIMAL THIGH
LOCATION DETAILED: INFERIOR THORACIC SPINE
LOCATION DETAILED: RIGHT MEDIAL SUPERIOR CHEST
LOCATION DETAILED: RIGHT ANTERIOR DISTAL UPPER ARM
LOCATION DETAILED: RIGHT ANTERIOR DISTAL THIGH
LOCATION DETAILED: RIGHT POSTERIOR SHOULDER
LOCATION DETAILED: SUPERIOR LUMBAR SPINE
LOCATION DETAILED: EPIGASTRIC SKIN
LOCATION DETAILED: LEFT ANTERIOR DISTAL THIGH
LOCATION DETAILED: RIGHT ANTERIOR PROXIMAL UPPER ARM

## 2023-06-27 ASSESSMENT — LOCATION SIMPLE DESCRIPTION DERM
LOCATION SIMPLE: ABDOMEN
LOCATION SIMPLE: UPPER BACK
LOCATION SIMPLE: RIGHT THIGH
LOCATION SIMPLE: LEFT THIGH
LOCATION SIMPLE: RIGHT SHOULDER
LOCATION SIMPLE: LEFT UPPER ARM
LOCATION SIMPLE: RIGHT UPPER ARM
LOCATION SIMPLE: CHEST
LOCATION SIMPLE: LOWER BACK

## 2023-08-16 NOTE — PROCEDURE: MEDICATION COUNSELING
Quality 226: Preventive Care And Screening: Tobacco Use: Screening And Cessation Intervention: Patient screened for tobacco use and is an ex/non-smoker Detail Level: Detailed Quality 431: Preventive Care And Screening: Unhealthy Alcohol Use - Screening: Patient not identified as an unhealthy alcohol user when screened for unhealthy alcohol use using a systematic screening method Doxycycline Counseling:  Patient counseled regarding possible photosensitivity and increased risk for sunburn.  Patient instructed to avoid sunlight, if possible.  When exposed to sunlight, patients should wear protective clothing, sunglasses, and sunscreen.  The patient was instructed to call the office immediately if the following severe adverse effects occur:  hearing changes, easy bruising/bleeding, severe headache, or vision changes.  The patient verbalized understanding of the proper use and possible adverse effects of doxycycline.  All of the patient's questions and concerns were addressed.

## 2023-09-06 ENCOUNTER — OFFICE VISIT (OUTPATIENT)
Dept: FAMILY MEDICINE | Facility: CLINIC | Age: 38
End: 2023-09-06
Payer: COMMERCIAL

## 2023-09-06 VITALS — BODY MASS INDEX: 30.13 KG/M2 | OXYGEN SATURATION: 99 % | TEMPERATURE: 97.9 F | WEIGHT: 216 LBS | HEART RATE: 70 BPM

## 2023-09-06 DIAGNOSIS — L72.0 EPIDERMOID CYST OF SKIN OF BACK: Primary | ICD-10-CM

## 2023-09-06 PROCEDURE — 3008F BODY MASS INDEX DOCD: CPT | Performed by: FAMILY MEDICINE

## 2023-09-06 PROCEDURE — 99213 OFFICE O/P EST LOW 20 MIN: CPT | Performed by: FAMILY MEDICINE

## 2023-09-06 RX ORDER — AMOXICILLIN AND CLAVULANATE POTASSIUM 875; 125 MG/1; MG/1
1 TABLET, FILM COATED ORAL 2 TIMES DAILY
Qty: 14 TABLET | Refills: 0 | Status: SHIPPED | OUTPATIENT
Start: 2023-09-06 | End: 2023-09-13

## 2023-09-06 ASSESSMENT — ENCOUNTER SYMPTOMS
CHILLS: 0
FEVER: 0

## 2023-09-06 NOTE — PATIENT INSTRUCTIONS
Instructions :   Wash with soap and water .   Use warm water for it in shower   Apply triple antibiotic and cover as needed   Take the augmentin 875mg twice a day for 5 days with food   5. Schedule appointment with dermatolgoy     Return to see Dr fonseca as needed

## 2023-09-06 NOTE — PROGRESS NOTES
[facility]     CHIEF COMPLAINT     Cc: inflamed cyst on mid back   HISTORY OF PRESENT ILLNESS      Naomi Lopez is a 38 y.o.  female who presents for an inflamed mid back epidermoid cyst  for a couple months . 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    FAMILY HISTORY      Family History   Problem Relation Age of Onset    Gout Biological Mother     Heart attack Maternal Grandfather     Stomach cancer Paternal Grandmother     Alzheimer's disease Paternal Grandfather        SOCIAL HISTORY      Social History     Socioeconomic History    Marital status: Single   Occupational History    Occupation: Therapist    Tobacco Use    Smoking status: Never    Smokeless tobacco: Never   Vaping Use    Vaping Use: Never used   Substance and Sexual Activity    Alcohol use: Yes     Alcohol/week: 2.0 standard drinks of alcohol     Types: 1 Glasses of wine, 1 Cans of beer per week     Comment: socially     Drug use: No    Sexual activity: Not Currently         Reviewed today : allergies/meds/problem list /social hx      REVIEW OF SYSTEMS      .Review of Systems   Constitutional:  Negative for chills and fever.   Skin:        +inflamed cyst on mid back       PHYSICAL EXAMINATION      Visit Vitals  Pulse 70   Temp 36.6 °C (97.9 °F)   Wt 98 kg (216 lb)   SpO2 99%   BMI 30.13 kg/m²     Body mass index is 30.13 kg/m².    Physical Exam  Constitutional:       General: She is not in acute distress.     Appearance: She is well-developed. She is not ill-appearing.   Skin:     Comments: Mid back 3x3mm lesion with pointing white pustule With surrounding erythema     Neurological:      Mental Status: She is alert.           LABS / IMAGING / STUDIES        Labs  Lab Results   Component Value Date    CREATININE 0.63 03/17/2023     Lab Results   Component Value Date    WBC 5.8 03/17/2023    HGB 13.1 03/17/2023    HCT 40.0 03/17/2023    MCV 84 03/17/2023     03/17/2023         No results found for: HGBA1C  No results found for: MICROALBUR, [id number]      HEALTH MAINTENANCE                    ASSESSMENT AND PLAN   Problem List Items Addressed This Visit    None  Visit Diagnoses     Epidermoid cyst of skin of back    -  Primary    wash with soap and water   apply warm water such as in shower   apply triple ab ointment cover   rx augmentin 875mg bid x 7 days   refer derm     Relevant Orders    Ambulatory referral to Dermatology      RTO  kulwant Salinas DO  09/06/23

## 2023-10-09 ENCOUNTER — APPOINTMENT (RX ONLY)
Dept: URBAN - METROPOLITAN AREA CLINIC 28 | Facility: CLINIC | Age: 38
Setting detail: DERMATOLOGY
End: 2023-10-09

## 2023-10-09 DIAGNOSIS — L71.0 PERIORAL DERMATITIS: ICD-10-CM | Status: STABLE

## 2023-10-09 PROBLEM — D23.5 OTHER BENIGN NEOPLASM OF SKIN OF TRUNK: Status: ACTIVE | Noted: 2023-10-09

## 2023-10-09 PROCEDURE — ? PRESCRIPTION MEDICATION MANAGEMENT

## 2023-10-09 PROCEDURE — ? CONSULTATION FOR EXCISION

## 2023-10-09 PROCEDURE — 99213 OFFICE O/P EST LOW 20 MIN: CPT

## 2023-10-09 PROCEDURE — ? PHOTO-DOCUMENTATION

## 2023-10-09 PROCEDURE — ? RECOMMENDATIONS

## 2023-10-09 PROCEDURE — ? COUNSELING

## 2023-10-09 PROCEDURE — ? PRESCRIPTION

## 2023-10-09 PROCEDURE — ? DEFER

## 2023-10-09 RX ORDER — METRONIDAZOLE 7.5 MG/G
CREAM TOPICAL QOHS
Qty: 45 | Refills: 2 | Status: ERX | COMMUNITY
Start: 2023-10-09

## 2023-10-09 RX ADMIN — METRONIDAZOLE: 7.5 CREAM TOPICAL at 00:00

## 2023-10-09 ASSESSMENT — LOCATION SIMPLE DESCRIPTION DERM
LOCATION SIMPLE: CHIN
LOCATION SIMPLE: LEFT CHEEK

## 2023-10-09 ASSESSMENT — LOCATION DETAILED DESCRIPTION DERM
LOCATION DETAILED: LEFT SUPERIOR CENTRAL MALAR CHEEK
LOCATION DETAILED: RIGHT CHIN

## 2023-10-09 ASSESSMENT — LOCATION ZONE DERM: LOCATION ZONE: FACE

## 2023-10-09 NOTE — PROCEDURE: DEFER
Detail Level: Zone
X Size Of Lesion In Cm (Optional): 0
Procedure To Be Performed At Next Visit: Punch Excision
Introduction Text (Please End With A Colon): The following procedure was deferred:

## 2023-10-09 NOTE — PROCEDURE: RECOMMENDATIONS
Detail Level: Zone
Recommendations (Free Text): Avoid Crest toothpaste\\nIf wearing mask, make sure it is a disposable mask
Recommendation Preamble: The following recommendations were made during the visit:
Render Risk Assessment In Note?: no

## 2023-10-09 NOTE — HPI: CYST
How Severe Is Your Cyst?: moderate
Is This A New Presentation, Or A Follow-Up?: Cyst
Additional History: Patient states the lesion is calm right now but has history of being inflamed. PCP recommended patient get evaluation of cyst excision.

## 2023-10-09 NOTE — PROCEDURE: PRESCRIPTION MEDICATION MANAGEMENT
Detail Level: Zone
Modify Regimen: metronidazole 1% topical gel: Apply a thin layer to AA of the face QOHS (weening off)
Render In Strict Bullet Format?: No

## 2023-11-07 ENCOUNTER — APPOINTMENT (RX ONLY)
Dept: URBAN - METROPOLITAN AREA CLINIC 28 | Facility: CLINIC | Age: 38
Setting detail: DERMATOLOGY
End: 2023-11-07

## 2023-11-07 DIAGNOSIS — L72.0 EPIDERMAL CYST: ICD-10-CM | Status: STABLE

## 2023-11-07 PROCEDURE — ? COUNSELING

## 2023-11-07 PROCEDURE — ? PUNCH EXCISION (NO PATHOLOGY)

## 2023-11-07 PROCEDURE — ? PHOTO-DOCUMENTATION

## 2023-11-07 PROCEDURE — 11400 EXC TR-EXT B9+MARG 0.5 CM<: CPT

## 2023-11-07 ASSESSMENT — LOCATION DETAILED DESCRIPTION DERM: LOCATION DETAILED: INFERIOR THORACIC SPINE

## 2023-11-07 ASSESSMENT — LOCATION ZONE DERM: LOCATION ZONE: TRUNK

## 2023-11-07 ASSESSMENT — LOCATION SIMPLE DESCRIPTION DERM: LOCATION SIMPLE: UPPER BACK

## 2023-11-07 NOTE — PROCEDURE: PUNCH EXCISION (NO PATHOLOGY)
Size Of Lesion In Cm: 0.4
X Size Of Lesion In Cm (Optional): 0
Anesthesia Volume In Cc: 0.5
Excision Method: 3 mm Punch
Repair Type: None (Simple)
Intermediate / Complex Repair - Final Wound Length In Cm: 0.8
Complex Requirements: Extensive Undermining Performed?: No
Undermining Type: Entire Wound
Debridement Text: The wound edges were debrided prior to proceeding with the closure to facilitate wound healing.
Helical Rim Text: The closure involved the helical rim.
Vermilion Border Text: The closure involved the vermilion border.
Nostril Rim Text: The closure involved the nostril rim.
Retention Suture Text: Retention sutures were placed to support the closure and prevent dehiscence.
Suture Removal: 7 days
Detail Level: Detailed
Excision Depth: adipose tissue
Medical Necessity Clause: The excision was medically necessary because the lesion which was excised was
Anesthesia Type: 1% lidocaine with epinephrine
Hemostasis: Ligature
Estimated Blood Loss (Cc): minimal
Epidermal Sutures: 4-0 Ethilon
Number Of Epidermal Sutures (Optional): 3
Epidermal Closure: simple interrupted
Wound Care: Mastisol
Dressing: steri-strips and pressure dressing
Complex Repair Preamble Text (Leave Blank If You Do Not Want): Extensive wide undermining was performed.
Intermediate Repair Preamble Text (Leave Blank If You Do Not Want): Undermining was performed with blunt dissection.
2 Mm Punch Excision Text: A 2 mm punch was used to make an initial incision over the lesion.  After this overlying column of skin was removed, blunt dissection was used to free the lesion from the surrounding tissues and the lesion was extirpated through the surgical opening made by the punch biopsy.
3 Mm Punch Excision Text: A 3 mm punch was used to make an initial incision over the lesion.  After this overlying column of skin was removed, blunt dissection was used to free the lesion from the surrounding tissues and the lesion was extirpated through the surgical opening made by the punch biopsy.
4 Mm Punch Excision Text: A 4 mm punch was used to make an initial incision over the lesion.  After this overlying column of skin was removed, blunt dissection was used to free the lesion from the surrounding tissues and the lesion was extirpated through the surgical opening made by the punch biopsy.
4.5 Mm Punch Excision Text: A 4.5 mm punch was used to make an initial incision over the lesion.  After this overlying column of skin was removed, blunt dissection was used to free the lesion from the surrounding tissues and the lesion was extirpated through the surgical opening made by the punch biopsy.
5 Mm Punch Excision Text: A 5 mm punch was used to make an initial incision over the lesion.  After this overlying column of skin was removed, blunt dissection was used to free the lesion from the surrounding tissues and the lesion was extirpated through the surgical opening made by the punch biopsy.
6 Mm Punch Excision Text: A 6 mm punch was used to make an initial incision over the lesion.  After this overlying column of skin was removed, blunt dissection was used to free the lesion from the surrounding tissues and the lesion was extirpated through the surgical opening made by the punch biopsy.
7 Mm Punch Excision Text: A 7 mm punch was used to make an initial incision over the lesion.  After this overlying column of skin was removed, blunt dissection was used to free the lesion from the surrounding tissues and the lesion was extirpated through the surgical opening made by the punch biopsy.
8 Mm Punch Excision Text: A 8 mm punch was used to make an initial incision over the lesion.  After this overlying column of skin was removed, blunt dissection was used to free the lesion from the surrounding tissues and the lesion was extirpated through the surgical opening made by the punch biopsy.
10 Mm Punch Excision Text: A 10 mm punch was used to make an initial incision over the lesion.  After this overlying column of skin was removed, blunt dissection was used to free the lesion from the surrounding tissues and the lesion was extirpated through the surgical opening made by the punch biopsy.
12 Mm Punch Excision Text: A 12 mm punch was used to make an initial incision over the lesion.  After this overlying column of skin was removed, blunt dissection was used to free the lesion from the surrounding tissues and the lesion was extirpated through the surgical opening made by the punch biopsy.
Purse String (Intermediate) Text: Given the location of the defect and the characteristics of the surrounding skin a purse string intermediate closure was deemed most appropriate.  Undermining was performed circumfirentially around the surgical defect.  A purse string suture was then placed and tightened.
Path Notes (To The Dermatopathologist): Please check margins.
Medical Necessity Clause: This procedure was medically necessary because the lesion that was treated was:
Consent was obtained from the patient. The risks and benefits to therapy were discussed in detail. Specifically, the risks of infection, scarring, bleeding, prolonged wound healing, incomplete removal, allergy to anesthesia, nerve injury and recurrence were addressed. Prior to the procedure, the treatment site was clearly identified and confirmed by the patient. All components of Universal Protocol/PAUSE Rule completed.
Render Post-Care Instructions In Note?: yes
Post-Care Instructions: I reviewed with the patient in detail post-care instructions. Patient is not to engage in any heavy lifting, exercise, or swimming for the next 14 days. Should the patient develop any fevers, chills, bleeding, severe pain patient will contact the office immediately.

## 2023-11-14 ENCOUNTER — APPOINTMENT (RX ONLY)
Dept: URBAN - METROPOLITAN AREA CLINIC 28 | Facility: CLINIC | Age: 38
Setting detail: DERMATOLOGY
End: 2023-11-14

## 2023-11-14 DIAGNOSIS — D22 MELANOCYTIC NEVI: ICD-10-CM

## 2023-11-14 DIAGNOSIS — Z48.02 ENCOUNTER FOR REMOVAL OF SUTURES: ICD-10-CM

## 2023-11-14 PROBLEM — D22.62 MELANOCYTIC NEVI OF LEFT UPPER LIMB, INCLUDING SHOULDER: Status: ACTIVE | Noted: 2023-11-14

## 2023-11-14 PROCEDURE — 99212 OFFICE O/P EST SF 10 MIN: CPT | Mod: 24

## 2023-11-14 PROCEDURE — ? COUNSELING

## 2023-11-14 PROCEDURE — ? PHOTO-DOCUMENTATION

## 2023-11-14 PROCEDURE — ? SUTURE REMOVAL (GLOBAL PERIOD)

## 2023-11-14 ASSESSMENT — LOCATION ZONE DERM
LOCATION ZONE: FINGER
LOCATION ZONE: TRUNK

## 2023-11-14 ASSESSMENT — LOCATION DETAILED DESCRIPTION DERM
LOCATION DETAILED: PERIUNGUAL SKIN LEFT THUMB
LOCATION DETAILED: INFERIOR THORACIC SPINE

## 2023-11-14 ASSESSMENT — LOCATION SIMPLE DESCRIPTION DERM
LOCATION SIMPLE: LEFT THUMB
LOCATION SIMPLE: UPPER BACK

## 2023-11-14 NOTE — PROCEDURE: SUTURE REMOVAL (GLOBAL PERIOD)
Detail Level: Zone
Add 44874 Cpt? (Important Note: In 2017 The Use Of 17549 Is Being Tracked By Cms To Determine Future Global Period Reimbursement For Global Periods): no

## 2023-11-15 ENCOUNTER — OFFICE VISIT (OUTPATIENT)
Dept: FAMILY MEDICINE | Facility: CLINIC | Age: 38
End: 2023-11-15
Payer: COMMERCIAL

## 2023-11-15 DIAGNOSIS — J06.9 VIRAL URI WITH COUGH: Primary | ICD-10-CM

## 2023-11-15 PROCEDURE — 99213 OFFICE O/P EST LOW 20 MIN: CPT | Performed by: FAMILY MEDICINE

## 2023-11-15 PROCEDURE — 3008F BODY MASS INDEX DOCD: CPT | Performed by: FAMILY MEDICINE

## 2023-11-15 RX ORDER — BENZONATATE 100 MG/1
100 CAPSULE ORAL 3 TIMES DAILY PRN
Qty: 30 CAPSULE | Refills: 0 | Status: SHIPPED | OUTPATIENT
Start: 2023-11-15 | End: 2023-11-25

## 2023-11-15 RX ORDER — FLUTICASONE PROPIONATE 50 MCG
1 SPRAY, SUSPENSION (ML) NASAL DAILY
Qty: 16 G | Refills: 1 | Status: SHIPPED | OUTPATIENT
Start: 2023-11-15 | End: 2024-03-27

## 2023-11-15 RX ORDER — NYSTATIN 100000 [USP'U]/ML
5 SUSPENSION ORAL 4 TIMES DAILY
Qty: 100 ML | Refills: 0 | Status: SHIPPED | OUTPATIENT
Start: 2023-11-15 | End: 2023-11-20

## 2023-11-15 RX ORDER — BUDESONIDE AND FORMOTEROL FUMARATE DIHYDRATE 80; 4.5 UG/1; UG/1
2 AEROSOL RESPIRATORY (INHALATION)
Qty: 10.2 G | Refills: 0 | Status: SHIPPED | OUTPATIENT
Start: 2023-11-15 | End: 2024-03-28

## 2023-11-15 NOTE — PROGRESS NOTES
"   [facility] Medicine     CHIEF COMPLAINT     Cc: persistent URI symptoms    HISTORY OF PRESENT ILLNESS      Naomi Lopez is a 38 y.o.  female who presents for persistent cough for 12 days   With  intermittent sore throat , some nasal congestion, tiredness and occasional sob  . She denies swollen glands  , wheezing   Patient denies fever, or chills   She went to [facility]  urgent care 11/9/2023 .  A cxr was ordered , it  showed  'inflammation  \"   report not available today . Patient says she was  not tested for covid /flu /strep.    Patient was Tx' d by   for acute bronchitis with  3 days of  Antibiotic( ? zithromax )  and albuterol inhaler . She completed treatment and  Continues  to use tussin for cough ,cough drops and drinks plenty of  fluids and still does not feel she is improving      Vitals today are stable.,  Patient is afebrile . 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     Rosacea     Rosacea     Rosacea        Past Surgical History:   Procedure Laterality Date    ORIF HUMERUS FRACTURE Right 1991       MEDICATIONS        Current Outpatient Medications:     benzonatate (TESSALON PERLES) 100 mg capsule, Take 1 capsule (100 mg total) by mouth 3 (three) times a day as needed for cough for up to 10 days., Disp: 30 capsule, Rfl: 0    budesonide-formoteroL (SYMBICORT) 80-4.5 mcg/actuation inhaler, Inhale 2 puffs 2 (two) times a day., Disp: 10.2 g, Rfl: 0    fluticasone propionate (FLONASE) 50 mcg/actuation nasal spray, Administer 1 spray into each nostril daily., Disp: 16 g, Rfl: 1    nystatin (MYCOSTATIN) 100,000 unit/mL suspension, Swish and spit 5 mL (500,000 Units total) " 4 (four) times a day for 5 days., Disp: 100 mL, Rfl: 0    cycloSPORINE (RESTASIS) 0.05 % ophthalmic emulsion, , Disp: , Rfl:     ALLERGIES      Patient has no known allergies.    FAMILY HISTORY      Family History   Problem Relation Age of Onset    Gout Biological Mother     Heart attack Maternal Grandfather     Stomach cancer Paternal Grandmother     Alzheimer's disease Paternal Grandfather        SOCIAL HISTORY      Social History     Socioeconomic History    Marital status: Single   Occupational History    Occupation: Therapist    Tobacco Use    Smoking status: Never    Smokeless tobacco: Never   Vaping Use    Vaping Use: Never used   Substance and Sexual Activity    Alcohol use: Yes     Alcohol/week: 2.0 standard drinks of alcohol     Types: 1 Glasses of wine, 1 Cans of beer per week     Comment: socially     Drug use: No    Sexual activity: Not Currently         Reviewed today : allergies/meds/problem list /social hx      REVIEW OF SYSTEMS      .Review of Systems   Constitutional: Negative for appetite change, chills and fever.   HENT: Positive for congestion, postnasal drip and sore throat (intermittent). Negative for sinus pressure.         + coating on tongue   Respiratory: Positive for cough. Negative for shortness of breath and wheezing.    Cardiovascular: Negative for chest pain and palpitations.   Gastrointestinal: Negative for diarrhea, nausea and vomiting.   Skin: Negative for rash.   Neurological: Negative for dizziness and headaches.   Hematological: Negative for adenopathy.       PHYSICAL EXAMINATION      Visit Vitals  /70 (BP Location: Left upper arm, Patient Position: Sitting)   Pulse 79   Temp 36.6 °C (97.9 °F)   Wt 97.5 kg (215 lb)   SpO2 99%   BMI 29.99 kg/m²     Body mass index is 29.99 kg/m².    Physical Exam  Constitutional:       General: She is not in acute distress.     Appearance: She is well-developed. She is not ill-appearing.   HENT:      Nose: Congestion present.  "No rhinorrhea.      Mouth/Throat:      Pharynx: No oropharyngeal exudate or posterior oropharyngeal erythema.      Comments: + mild coating on tongue consistent with thrush  Eyes:      General: No scleral icterus.        Right eye: No discharge.         Left eye: No discharge.      Conjunctiva/sclera: Conjunctivae normal.   Cardiovascular:      Rate and Rhythm: Normal rate and regular rhythm.      Heart sounds: Normal heart sounds. No murmur heard.     No gallop.   Pulmonary:      Effort: Pulmonary effort is normal. No respiratory distress.      Breath sounds: Normal breath sounds. No wheezing, rhonchi or rales.   Neurological:      Mental Status: She is alert.           LABS / IMAGING / STUDIES        Labs  Lab Results   Component Value Date    CREATININE 0.63 03/17/2023     Lab Results   Component Value Date    WBC 5.8 03/17/2023    HGB 13.1 03/17/2023    HCT 40.0 03/17/2023    MCV 84 03/17/2023     03/17/2023         No results found for: \"HGBA1C\"  No results found for: \"MICROALBUR\", \"[id number]\"      HEALTH MAINTENANCE                   ASSESSMENT AND PLAN   Problem List Items Addressed This Visit    None  Visit Diagnoses     Viral URI with cough    -  Primary    start flonase for nasal congestion  rx benzonatate perles as directed for cough  rx symbicort for sob and RAD   rx nystatin swish and spit out qid for thrush         advised patient to continue to drink plenty of fluids   And may continue lozenges for sore throat or tylenol prn     If cough or sob persists or worsens when patient eventually weans off symbicort in a few wks , will refer for CXR and pulm.     RTO as needed.           Marilee Salinas, DO  11/16/23        "

## 2023-11-15 NOTE — PATIENT INSTRUCTIONS
Instructions:   Continue current medications and start benzonatate perles for cough as directed.    Use the flonase as directed   Use the symbicort inhaler as directed and gargle after the inhaler   Use nystatin rinse as directed  Choose healthy foods,and drink plenty of fluid   Get adequate sleep , goal is 7 hours per night       Follow up with Dr Salinas as needed

## 2023-11-16 VITALS
SYSTOLIC BLOOD PRESSURE: 104 MMHG | OXYGEN SATURATION: 99 % | BODY MASS INDEX: 29.99 KG/M2 | WEIGHT: 215 LBS | TEMPERATURE: 97.9 F | DIASTOLIC BLOOD PRESSURE: 70 MMHG | HEART RATE: 79 BPM

## 2023-11-16 ASSESSMENT — ENCOUNTER SYMPTOMS
DIARRHEA: 0
SINUS PRESSURE: 0
WHEEZING: 0
DIZZINESS: 0
SHORTNESS OF BREATH: 0
HEADACHES: 0
CHILLS: 0
PALPITATIONS: 0
SORE THROAT: 1
FEVER: 0
COUGH: 1
VOMITING: 0
NAUSEA: 0
APPETITE CHANGE: 0
ADENOPATHY: 0

## 2024-03-07 ENCOUNTER — APPOINTMENT (RX ONLY)
Dept: URBAN - METROPOLITAN AREA CLINIC 28 | Facility: CLINIC | Age: 39
Setting detail: DERMATOLOGY
End: 2024-03-07

## 2024-03-07 DIAGNOSIS — D22 MELANOCYTIC NEVI: ICD-10-CM

## 2024-03-07 DIAGNOSIS — L90.5 SCAR CONDITIONS AND FIBROSIS OF SKIN: ICD-10-CM

## 2024-03-07 DIAGNOSIS — D69.2 OTHER NONTHROMBOCYTOPENIC PURPURA: ICD-10-CM

## 2024-03-07 PROBLEM — D22.62 MELANOCYTIC NEVI OF LEFT UPPER LIMB, INCLUDING SHOULDER: Status: ACTIVE | Noted: 2024-03-07

## 2024-03-07 PROCEDURE — 99213 OFFICE O/P EST LOW 20 MIN: CPT

## 2024-03-07 PROCEDURE — ? COUNSELING

## 2024-03-07 ASSESSMENT — LOCATION DETAILED DESCRIPTION DERM
LOCATION DETAILED: LEFT VENTRAL DISTAL FOREARM
LOCATION DETAILED: DORSAL INTERPHALANGEAL JOINT LEFT THUMB

## 2024-03-07 ASSESSMENT — LOCATION ZONE DERM
LOCATION ZONE: FINGER
LOCATION ZONE: ARM

## 2024-03-07 ASSESSMENT — LOCATION SIMPLE DESCRIPTION DERM
LOCATION SIMPLE: LEFT THUMB
LOCATION SIMPLE: LEFT FOREARM

## 2024-03-07 NOTE — HPI: BUMPS
How Severe Are Your Bumps?: mild
Have Your Bumps Been Treated?: not been treated
Is This A New Presentation, Or A Follow-Up?: Bump
Additional History: Patient notes red everett on skin for about 1 month. She does not recall preceding trauma. She notes she recently got a bruise near the red everett (the bruise appeared 3 days ago and is improving). \\n\\nShe also reports a mole on her right thumb she would like evaluated.

## 2024-03-26 ASSESSMENT — ENCOUNTER SYMPTOMS
FEVER: 0
FATIGUE: 0
PALPITATIONS: 0
CHILLS: 0
VOMITING: 0
COUGH: 0
SORE THROAT: 0
DIZZINESS: 0
SHORTNESS OF BREATH: 0
DIARRHEA: 0
NAUSEA: 0
WHEEZING: 0
HEADACHES: 0

## 2024-03-27 ENCOUNTER — OFFICE VISIT (OUTPATIENT)
Dept: FAMILY MEDICINE | Facility: CLINIC | Age: 39
End: 2024-03-27
Payer: COMMERCIAL

## 2024-03-27 VITALS
SYSTOLIC BLOOD PRESSURE: 110 MMHG | BODY MASS INDEX: 29.82 KG/M2 | HEIGHT: 72 IN | TEMPERATURE: 97.5 F | DIASTOLIC BLOOD PRESSURE: 80 MMHG | OXYGEN SATURATION: 98 % | WEIGHT: 220.2 LBS | HEART RATE: 76 BPM

## 2024-03-27 DIAGNOSIS — Z13.220 SCREENING FOR LIPID DISORDERS: ICD-10-CM

## 2024-03-27 DIAGNOSIS — R14.0 BLOATING: ICD-10-CM

## 2024-03-27 DIAGNOSIS — Z00.00 ROUTINE MEDICAL EXAM: Primary | ICD-10-CM

## 2024-03-27 PROCEDURE — 3008F BODY MASS INDEX DOCD: CPT | Performed by: FAMILY MEDICINE

## 2024-03-27 PROCEDURE — 99395 PREV VISIT EST AGE 18-39: CPT | Performed by: FAMILY MEDICINE

## 2024-03-27 ASSESSMENT — PATIENT HEALTH QUESTIONNAIRE - PHQ9: SUM OF ALL RESPONSES TO PHQ9 QUESTIONS 1 & 2: 0

## 2024-03-27 ASSESSMENT — ENCOUNTER SYMPTOMS
ARTHRALGIAS: 0
DYSPHORIC MOOD: 0

## 2024-03-27 NOTE — PROGRESS NOTES
[facility] Medicine     CHIEF COMPLAINT     Cc: annual physical    HISTORY OF PRESENT ILLNESS      Naomi Lopez is a 38 y.o.  female who presents for annual physical   Exercises .  Working on  improving food choices     Does yoga for exercise as well as other cardio    Discussed current medical guideline preventative care .    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    FAMILY HISTORY      Family History   Problem Relation Age of Onset    Gout Biological Mother     Heart attack Maternal Grandfather     Stomach cancer Paternal Grandmother     Alzheimer's disease Paternal Grandfather        SOCIAL HISTORY      Social History     Socioeconomic History    Marital status: Single     Spouse name: None    Number of children: None    Years of education: None    Highest education level: None   Occupational History    Occupation: Therapist    Tobacco Use    Smoking status: Never    Smokeless tobacco: Never   Vaping Use    Vaping Use: Never used   Substance and Sexual Activity    Alcohol use: Yes     Alcohol/week: 2.0 standard drinks  of alcohol     Types: 1 Glasses of wine, 1 Cans of beer per week     Comment: socially     Drug use: No    Sexual activity: Not Currently         Reviewed today : allergies/meds/problem list /social hx      REVIEW OF SYSTEMS      .Review of Systems   Constitutional:  Negative for chills, fatigue and fever.   HENT:  Positive for congestion and postnasal drip. Negative for sore throat.    Eyes:  Negative for visual disturbance.   Respiratory:  Negative for cough, shortness of breath and wheezing.    Cardiovascular:  Negative for chest pain, palpitations and leg swelling.   Gastrointestinal:  Negative for diarrhea, nausea and vomiting.   Genitourinary:  Negative for pelvic pain.   Musculoskeletal:  Negative for arthralgias.   Skin:  Negative for rash.   Neurological:  Negative for dizziness and headaches.   Psychiatric/Behavioral:  Negative for dysphoric mood.      Patient says nasal symptoms are improving     PHYSICAL EXAMINATION      Visit Vitals  /80 (BP Location: Left upper arm, Patient Position: Sitting)   Pulse 76   Temp 36.4 °C (97.5 °F)   Ht 1.829 m (6')   Wt 99.9 kg (220 lb 3.2 oz)   SpO2 98%   BMI 29.86 kg/m²     Body mass index is 29.86 kg/m².    Physical Exam  Constitutional:       General: She is not in acute distress.     Appearance: She is well-developed. She is not ill-appearing.   HENT:      Right Ear: Tympanic membrane normal.      Left Ear: Tympanic membrane normal.      Nose: Nose normal.      Mouth/Throat:      Mouth: Mucous membranes are moist.   Eyes:      General: No scleral icterus.        Right eye: No discharge.         Left eye: No discharge.      Conjunctiva/sclera: Conjunctivae normal.   Cardiovascular:      Rate and Rhythm: Normal rate and regular rhythm.      Heart sounds: No murmur heard.     No gallop.   Pulmonary:      Breath sounds: No wheezing or rales.   Abdominal:      Palpations: There is no mass.      Tenderness: There is no abdominal tenderness.   Musculoskeletal:       "Right lower leg: No edema.      Left lower leg: No edema.   Skin:     Findings: No rash.   Psychiatric:         Behavior: Behavior normal.           LABS / IMAGING / STUDIES        Labs  Lab Results   Component Value Date    CREATININE 0.63 03/17/2023     Lab Results   Component Value Date    WBC 5.8 03/17/2023    HGB 13.1 03/17/2023    HCT 40.0 03/17/2023    MCV 84 03/17/2023     03/17/2023         No results found for: \"HGBA1C\"  No results found for: \"MICROALBUR\", \"[id number]\"      HEALTH MAINTENANCE           Mammogram : baseline due age 40  [facility]   Baldo Mobley .    TDAP:2017  Covid: utd  Gardisil : utd         eye exam : utd  Dental : utd   Skin : sees Derm            ASSESSMENT AND PLAN   Problem List Items Addressed This Visit          Unprioritized    Routine medical exam - Primary    Relevant Orders    CBC and differential    Comprehensive metabolic panel    TSH w reflex FT4     Other Visit Diagnoses       Screening for lipid disorders        Relevant Orders    Lipid panel    Bloating        Relevant Orders    Tissue transglutaminase, IgG    Tissue transglutaminase, IgA        IBA/bloating : if TTG Ig G and TTG IgA are normal and patient still has bloating , recommend eliminate dairy /lactose and see if there is improvement of symptoms . If not , follow fodmap diet for short term .   And decrease gassy foods .    See how much fiber is being consumed .   And if still w/ sx's refer gi     Healthy practices and healthy goals:   -Exercise with a goal of 150 min/wk of cardio exercise and 2 days per week lifting  light weights or use resistance bands  and perform stretching exercises.   -Choose  Healthy foods and plate small food portions   -Weight loss goal :  Short term  weight loss goal of 10 lbs over the next several months.   -Drink plenty of water   -Perform regular dental examinations :  brushing and flossing daily.  -Check smoke and CO " detectors .  -Recommend a  Sleep Goal : 7-8  hrs sleep nightly   -Practice safe sex  to prevent  STI  s.    -Counseled  pt re:  prevention of alcohol and other substance abuse .     Patient discussion :   Depression screening pHQ2:0  Discussion of Physical activity : patient does exercise . discussed        Medication reconciliation :  med rec completed today with patient    Social determinants of health:  Transportation : no issues with transportation .   Finances : no financial impacts on health  Tobacco : pt denies smoking cigarettes  Alcohol : pt drinks occasional  alcohol .   One drink is equal to 12 oz 4% beer, 6 oz wine or 1.5 oz spirits.   domestic violence : pt denies any issues     Discussion of satisfaction with access to care : patient is satisfied with care today     Ordered fasting lab     RTO 1 year for physical          Marilee Salinas,   03/27/24

## 2024-03-27 NOTE — PATIENT INSTRUCTIONS
Instructions:   Continue current medications   Choose healthy foods, exercise and drink plenty of fluid   Get adequate sleep , goal is 7 -8 hours per night   Schedule fasting labs   Follow up with your specialists : gyn     Follow up with Dr Salinas in  1 year for physical

## 2024-04-11 LAB
TTG IGA SER-ACNC: <2 U/ML (ref 0–3)
TTG IGG SER-ACNC: <2 U/ML (ref 0–5)

## 2024-04-19 ENCOUNTER — TELEPHONE (OUTPATIENT)
Dept: FAMILY MEDICINE | Facility: CLINIC | Age: 39
End: 2024-04-19

## 2024-04-19 NOTE — TELEPHONE ENCOUNTER
[facility] Appointment Request   Provider: Dr Salinas  Appointment Type: SDS/OV  Reason for Visit: Having lower back pain for 2 weeks, not going away. Wants to make sure its is not something else going on.  Available Day and Time: depends on the day  Best Contact Number: [phone]    Patient does have appt but was hoping for something sooner. Will see another provider.    The practice will reach out to schedule your appointment within the next 2 business days.

## 2024-04-24 LAB
ALBUMIN SERPL-MCNC: 4.3 G/DL (ref 3.9–4.9)
ALBUMIN/GLOB SERPL: 1.8 {RATIO} (ref 1.2–2.2)
ALP SERPL-CCNC: 48 IU/L (ref 44–121)
ALT SERPL-CCNC: 15 IU/L (ref 0–32)
AST SERPL-CCNC: 17 IU/L (ref 0–40)
BASOPHILS # BLD AUTO: 0 X10E3/UL (ref 0–0.2)
BASOPHILS NFR BLD AUTO: 0 %
BILIRUB SERPL-MCNC: 0.5 MG/DL (ref 0–1.2)
BUN SERPL-MCNC: 7 MG/DL (ref 6–20)
BUN/CREAT SERPL: 11 (ref 9–23)
CALCIUM SERPL-MCNC: 9.3 MG/DL (ref 8.7–10.2)
CHLORIDE SERPL-SCNC: 102 MMOL/L (ref 96–106)
CHOLEST SERPL-MCNC: 159 MG/DL (ref 100–199)
CO2 SERPL-SCNC: 23 MMOL/L (ref 20–29)
CREAT SERPL-MCNC: 0.61 MG/DL (ref 0.57–1)
EGFRCR SERPLBLD CKD-EPI 2021: 117 ML/MIN/1.73
EOSINOPHIL # BLD AUTO: 0.1 X10E3/UL (ref 0–0.4)
EOSINOPHIL NFR BLD AUTO: 2 %
ERYTHROCYTE [DISTWIDTH] IN BLOOD BY AUTOMATED COUNT: 13.8 % (ref 11.7–15.4)
GLOBULIN SER CALC-MCNC: 2.4 G/DL (ref 1.5–4.5)
GLUCOSE SERPL-MCNC: 92 MG/DL (ref 70–99)
HCT VFR BLD AUTO: 42.8 % (ref 34–46.6)
HDLC SERPL-MCNC: 43 MG/DL
HGB BLD-MCNC: 13.3 G/DL (ref 11.1–15.9)
IMM GRANULOCYTES # BLD AUTO: 0 X10E3/UL (ref 0–0.1)
IMM GRANULOCYTES NFR BLD AUTO: 0 %
LDLC SERPL CALC-MCNC: 93 MG/DL (ref 0–99)
LYMPHOCYTES # BLD AUTO: 1.9 X10E3/UL (ref 0.7–3.1)
LYMPHOCYTES NFR BLD AUTO: 28 %
MCH RBC QN AUTO: 27.1 PG (ref 26.6–33)
MCHC RBC AUTO-ENTMCNC: 31.1 G/DL (ref 31.5–35.7)
MCV RBC AUTO: 87 FL (ref 79–97)
MONOCYTES # BLD AUTO: 0.4 X10E3/UL (ref 0.1–0.9)
MONOCYTES NFR BLD AUTO: 6 %
NEUTROPHILS # BLD AUTO: 4.4 X10E3/UL (ref 1.4–7)
NEUTROPHILS NFR BLD AUTO: 64 %
PLATELET # BLD AUTO: 242 X10E3/UL (ref 150–450)
POTASSIUM SERPL-SCNC: 4.3 MMOL/L (ref 3.5–5.2)
PROT SERPL-MCNC: 6.7 G/DL (ref 6–8.5)
RBC # BLD AUTO: 4.9 X10E6/UL (ref 3.77–5.28)
SODIUM SERPL-SCNC: 140 MMOL/L (ref 134–144)
T4 FREE SERPL-MCNC: 1.1 NG/DL (ref 0.82–1.77)
TRIGL SERPL-MCNC: 130 MG/DL (ref 0–149)
TSH SERPL DL<=0.005 MIU/L-ACNC: 2.31 UIU/ML (ref 0.45–4.5)
VLDLC SERPL CALC-MCNC: 23 MG/DL (ref 5–40)
WBC # BLD AUTO: 6.9 X10E3/UL (ref 3.4–10.8)

## 2024-06-04 ENCOUNTER — OFFICE VISIT (OUTPATIENT)
Dept: FAMILY MEDICINE | Facility: CLINIC | Age: 39
End: 2024-06-04
Payer: COMMERCIAL

## 2024-06-04 VITALS — OXYGEN SATURATION: 99 % | BODY MASS INDEX: 29.43 KG/M2 | TEMPERATURE: 97.7 F | WEIGHT: 217 LBS | HEART RATE: 73 BPM

## 2024-06-04 DIAGNOSIS — R20.0 NUMBNESS OF RIGHT LOWER EXTREMITY: ICD-10-CM

## 2024-06-04 DIAGNOSIS — R06.02 SOB (SHORTNESS OF BREATH): ICD-10-CM

## 2024-06-04 DIAGNOSIS — J40 FREQUENT EPISODES OF BRONCHITIS: ICD-10-CM

## 2024-06-04 DIAGNOSIS — M54.16 LUMBAR RADICULITIS: Primary | ICD-10-CM

## 2024-06-04 PROCEDURE — 99214 OFFICE O/P EST MOD 30 MIN: CPT | Performed by: FAMILY MEDICINE

## 2024-06-04 PROCEDURE — 3008F BODY MASS INDEX DOCD: CPT | Performed by: FAMILY MEDICINE

## 2024-06-04 RX ORDER — METHYLPREDNISOLONE 4 MG/1
TABLET ORAL
Qty: 21 TABLET | Refills: 0 | Status: SHIPPED | OUTPATIENT
Start: 2024-06-04 | End: 2024-06-11

## 2024-06-04 NOTE — PROGRESS NOTES
[facility] Medicine     CHIEF COMPLAINT     Cc: Low back pain , radiculitis RLE with RLE numbess   HISTORY OF PRESENT ILLNESS      Naomi Lopez is a 38 y.o.  female who presents for low back pain with RLE radiculitis symptoms, RLE numbness ,and a little RLE weakness  Sees chiropractor   Doing home exercises   Back Pain Started late march 2024 , after coughing .    Had also done a hike prior the onset of pain  Denies red flag symptoms of urine or bowel incontinence or saddle anesthesia  Patient tried ibuprofen  and tylenol ES ,. Not much help     Previous severe LBP episode occurred  2 yrs ago      Still with some  sob with chest heaviness intermittently since had 2 episodes bronchitis over the past year , nov 2023 , march 2024 .   Fhx of paternal GF had MI age 45 . Nonsmoker   Patient is a nonsmoker     .    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    FAMILY HISTORY      Family History   Problem Relation Age of Onset    Gout Biological Mother     Heart attack Maternal Grandfather     Stomach cancer Paternal Grandmother     Alzheimer's disease Paternal Grandfather        SOCIAL HISTORY      Social History     Socioeconomic History    Marital status: Single     Spouse name: None    Number of children: None    Years of  "education: None    Highest education level: None   Occupational History    Occupation: Therapist    Tobacco Use    Smoking status: Never    Smokeless tobacco: Never   Vaping Use    Vaping Use: Never used   Substance and Sexual Activity    Alcohol use: Yes     Alcohol/week: 2.0 standard drinks of alcohol     Types: 1 Glasses of wine, 1 Cans of beer per week     Comment: socially     Drug use: No    Sexual activity: Not Currently         Reviewed today : allergies/meds/problem list /social hx      REVIEW OF SYSTEMS      .Review of Systems   Constitutional:  Negative for chills, fatigue and fever.   Respiratory:  Positive for cough and shortness of breath.    Genitourinary:  Negative for dysuria.   Musculoskeletal:  Positive for back pain (with RLE radiating pain). Negative for gait problem and myalgias.   Skin:  Negative for rash.   Neurological:  Positive for weakness (RLE). Negative for numbness (intermittnet numbness RLE).       PHYSICAL EXAMINATION      Visit Vitals  Pulse 73   Temp 36.5 °C (97.7 °F)   Wt 98.4 kg (217 lb)   SpO2 99%   BMI 29.43 kg/m²     Body mass index is 29.43 kg/m².    Physical Exam  Constitutional:       General: She is not in acute distress.     Appearance: She is well-developed. She is not ill-appearing.   Cardiovascular:      Rate and Rhythm: Normal rate and regular rhythm.   Pulmonary:      Breath sounds: No wheezing, rhonchi or rales.   Musculoskeletal:      Comments: Lumbar area : + increased muscle tonicity with +TTP    4+ RLE extension strength   4+ RLE ankle strength   + RLE SLRT     Gait : antalgic            LABS / IMAGING / STUDIES        Labs  Lab Results   Component Value Date    CREATININE 0.61 04/23/2024     Lab Results   Component Value Date    WBC 6.9 04/23/2024    HGB 13.3 04/23/2024    HCT 42.8 04/23/2024    MCV 87 04/23/2024     04/23/2024         No results found for: \"HGBA1C\"  No results found for: \"MICROALBUR\", \"[id number]\"      HEALTH MAINTENANCE                " "    ASSESSMENT AND PLAN   Problem List Items Addressed This Visit    None  Visit Diagnoses       Lumbar radiculitis    -  Primary    rx medrol dose naveed  refer lumbar spine x-ray   will refer MRI once lumbar  x-ray results are available due to numbness and weakness on exam    Relevant Orders    X-RAY LUMBAR SPINE COMPLETE 4+ VIEWS    Numbness of right lower extremity        Relevant Orders    X-RAY LUMBAR SPINE COMPLETE 4+ VIEWS    SOB (shortness of breath)        refer to pulmonary  r/o asthma vs other pathology  anticipate will need spirometry or PFT\"s   patient will try to get previous   cxr results    Relevant Orders    Ambulatory referral to Pulmonology    Frequent episodes of bronchitis        refer pulm for further evalaution    Relevant Orders    Ambulatory referral to Pulmonology        FHX of CAD .  FHX early age  MI .patient with some sob., though no other CAD risk factors. Discussed seeing cardiology   Patient a little reluctant .  She will  schedule for pulmonary eval. first    RTO prn           Marilee Salinas, DO  06/06/24        "

## 2024-06-04 NOTE — PATIENT INSTRUCTIONS
Instructions:   Start medrol dose naveed    Schedule lumbar x-ray   Follow up with your chiropractor   Schedule with pulmonary Dr Ashraf     Follow up with Dr Salinas  as needed.

## 2024-06-06 ASSESSMENT — ENCOUNTER SYMPTOMS
COUGH: 1
CHILLS: 0
SHORTNESS OF BREATH: 1
FEVER: 0
FATIGUE: 0
MYALGIAS: 0
WEAKNESS: 1
BACK PAIN: 1
DYSURIA: 0
NUMBNESS: 0

## 2024-06-10 ENCOUNTER — HOSPITAL ENCOUNTER (OUTPATIENT)
Dept: RADIOLOGY | Facility: HOSPITAL | Age: 39
Discharge: HOME | End: 2024-06-10
Attending: FAMILY MEDICINE
Payer: COMMERCIAL

## 2024-06-10 DIAGNOSIS — M54.16 LUMBAR RADICULITIS: ICD-10-CM

## 2024-06-10 DIAGNOSIS — R20.0 NUMBNESS OF RIGHT LOWER EXTREMITY: ICD-10-CM

## 2024-06-10 PROCEDURE — 72110 X-RAY EXAM L-2 SPINE 4/>VWS: CPT

## 2024-06-11 DIAGNOSIS — R20.0 NUMBNESS AND TINGLING OF RIGHT LEG: ICD-10-CM

## 2024-06-11 DIAGNOSIS — M54.16 RIGHT LUMBAR RADICULITIS: Primary | ICD-10-CM

## 2024-06-11 DIAGNOSIS — R29.898 WEAKNESS OF RIGHT LOWER EXTREMITY: ICD-10-CM

## 2024-06-11 DIAGNOSIS — R20.2 NUMBNESS AND TINGLING OF RIGHT LEG: ICD-10-CM

## 2024-06-17 ENCOUNTER — TELEPHONE (OUTPATIENT)
Dept: FAMILY MEDICINE | Facility: CLINIC | Age: 39
End: 2024-06-17
Payer: COMMERCIAL

## 2024-06-17 NOTE — TELEPHONE ENCOUNTER
Insurance Pre-Certification Request   Patient PCP: Marilee Salinas DO  Insurance Carrier: [facility]  Ordering Provider: Rita  Ordering Provider NPI:    Testing Location: [facility]  Testing Location NPI: [id number]  Test Name or Procedure Code: MRI Lumbar Spine Without Contrast  Reason for Test or Diagnosis Code:  Right lumbar radiculitis [M54.16]  Numbness and tingling of right leg [R20.0, R20.2]  Weakness of right lower extremity [R29.898]   Appointment Date: TBD    Please call patient to let her know the status of this. She was unsure if a pre cert had already been started.     Testing Pre-Certification Request will be submitted to Insurance within 2 business days.

## 2024-07-03 ENCOUNTER — HOSPITAL ENCOUNTER (OUTPATIENT)
Dept: RADIOLOGY | Facility: HOSPITAL | Age: 39
Discharge: HOME | End: 2024-07-03
Attending: FAMILY MEDICINE
Payer: COMMERCIAL

## 2024-07-03 DIAGNOSIS — R20.0 NUMBNESS AND TINGLING OF RIGHT LEG: ICD-10-CM

## 2024-07-03 DIAGNOSIS — M54.16 RIGHT LUMBAR RADICULITIS: ICD-10-CM

## 2024-07-03 DIAGNOSIS — R20.2 NUMBNESS AND TINGLING OF RIGHT LEG: ICD-10-CM

## 2024-07-03 DIAGNOSIS — R29.898 WEAKNESS OF RIGHT LOWER EXTREMITY: ICD-10-CM

## 2024-07-03 PROCEDURE — 72148 MRI LUMBAR SPINE W/O DYE: CPT

## 2024-07-11 ENCOUNTER — OFFICE VISIT (OUTPATIENT)
Dept: ORTHOPEDICS | Facility: CLINIC | Age: 39
End: 2024-07-11
Payer: COMMERCIAL

## 2024-07-11 VITALS — HEIGHT: 71 IN | WEIGHT: 220 LBS | BODY MASS INDEX: 30.8 KG/M2

## 2024-07-11 DIAGNOSIS — M54.16 LUMBAR RADICULOPATHY: Primary | ICD-10-CM

## 2024-07-11 DIAGNOSIS — M51.369 LUMBAR DEGENERATIVE DISC DISEASE: ICD-10-CM

## 2024-07-11 DIAGNOSIS — M51.26 HNP (HERNIATED NUCLEUS PULPOSUS), LUMBAR: ICD-10-CM

## 2024-07-11 PROCEDURE — 99204 OFFICE O/P NEW MOD 45 MIN: CPT | Performed by: ORTHOPAEDIC SURGERY

## 2024-07-11 PROCEDURE — 3008F BODY MASS INDEX DOCD: CPT | Performed by: ORTHOPAEDIC SURGERY

## 2024-07-18 PROBLEM — M51.26 HNP (HERNIATED NUCLEUS PULPOSUS), LUMBAR: Status: ACTIVE | Noted: 2024-07-18

## 2024-07-18 PROBLEM — M54.16 LUMBAR RADICULOPATHY: Status: ACTIVE | Noted: 2024-07-18

## 2024-07-18 PROBLEM — M51.369 LUMBAR DEGENERATIVE DISC DISEASE: Status: ACTIVE | Noted: 2024-07-18

## 2024-07-18 NOTE — PROGRESS NOTES
ORTHOSPINE H&P  Admitting Diagnosis:  No admission diagnoses are documented for this encounter.      CHIEF COMPLAINT : 1.  Low back pain  2.  Right leg pain  3.  Right lower extremity numbness paresthesias  4.  With subjective right lower extremity weakness    HPI :     Patient is a 38 y.o. female who presents with 3-month history of right-sided lower back pain and now dermatomal leg pain complaints S1 distribution right-sided.    Neuritic symptoms include pain, numbness paresthesias subjective weakness in her right lower extremity.    No prior history reported.    No physical therapy injection therapy to date.    Utilizing Tylenol Advil mild relief attempted improvement with a Medrol Dosepak which provided no benefit    Denies any central related complaints or contralateral symptomatology.     Medical History:   Past Medical History:   Diagnosis Date    Chalazion     Dysplastic nevi     HPV (human papilloma virus) anogenital infection     cured     HPV in female     Mononucleosis     Renal failure after problem involving hydatidiform mole or other abnormal product of conception     Rosacea     Rosacea     Rosacea        Surgical History:   Past Surgical History:   Procedure Laterality Date    ORIF HUMERUS FRACTURE Right 1991       Social History:   Social History     Social History Narrative    Not on file       Family History:   Family History   Problem Relation Age of Onset    Gout Biological Mother     Heart attack Maternal Grandfather     Stomach cancer Paternal Grandmother     Alzheimer's disease Paternal Grandfather        Allergies: Patient has no known allergies.       Medication List            Accurate as of July 11, 2024 11:59 PM. If you have any questions, ask your nurse or doctor.                CONTINUE taking these medications      RESTASIS 0.05 % ophthalmic emulsion  Generic drug: cycloSPORINE            Review of Systems  All other systems reviewed and negative except as noted in the  "HPI.    Objective       Physical Exam : Visit Vitals  Ht 1.803 m (5' 11\")   Wt 99.8 kg (220 lb)   BMI 30.68 kg/m²     Alert and oriented to person time and place.  Gait is nonantalgic nonmyelopathic.  No assistive ice being utilized for ambulation.  Neurologic exam is nonfocal      Imaging : Personal review x-rays lumbar spine outline mild lumbar disc disease at L5-S1 by x-ray criteria otherwise a normal diagnostic study with no evidence of coronal deformity, instability, or translational instability of the lumbar vertebral segments.  Lumbar lordosis is maintained with essentially normal global spinal anatomy.    Personal view MRI scan lumbar spine notes congenital lumbar stenosis by MRI criteria.  Degenerative lumbar disc disease early stages L4-5 L5-S1.  On the axial sequences the L4-L5 level notes a contained degenerative disc protrusion without clinical compromise of the thecal sac or clinically relevant compromise of the exiting or traversing nerve roots.  L5-S1 notes a right paracentral spondylotic disc herniation extrusion type with subligamentous extent.  Severe lateral recess compression is noted with compromise of the traversing S1 nerve root on the right.  No central thecal sac compromise or severe central stenosis is appreciated.    MRI report x-ray report reviewed      IMPRESSION : 1.  Congenital lumbar stenosis  2.  Mild lumbar degenerative disc disease L4-5 L5-S1 with contained L4-5 degenerative disc protrusion  3.  Right paracentral spondylotic L5-S1 herniated nucleus pulposus with right S1 radiculopathy    PLAN : Treatment options reviewed I have discussed the indications for spinal surgery and the fact that she has not had any adequate treatment at this point with this being her first episode related to lumbar degenerative disc pathology.    I have outlined the role of rehabilitation as well as minimally invasive intervention and did discuss the surgical algorithm and treatment as relates to lumbar " disc herniations and operative management.    Type of surgical procedure and outcomes were touched upon to round out this visit.    Considering her age at 38, first episode, 3 months worth of symptomatology and neurologic preservation I did discuss attempting improvement with nonoperative and minimally invasive treatment options        Guillermo Moura MD  7/18/2024  7:18 AM

## 2024-08-19 ENCOUNTER — TELEPHONE (OUTPATIENT)
Dept: FAMILY MEDICINE | Facility: CLINIC | Age: 39
End: 2024-08-19

## 2024-08-19 NOTE — TELEPHONE ENCOUNTER
[facility] Appointment Request   Provider: Rita  Appointment Type: Office Visit   Reason for Visit: Exposure to Covid 19 07/13. Intermitting shortness of breath since exposure in July. Seen in ER for sympots two weeks ago. test came back normal   Available Day and Time: ASAP   Best Contact Number: [phone]    The practice will reach out to schedule your appointment within the next 2 business days.

## 2024-08-26 ENCOUNTER — TELEMEDICINE (OUTPATIENT)
Dept: FAMILY MEDICINE | Facility: CLINIC | Age: 39
End: 2024-08-26
Payer: COMMERCIAL

## 2024-08-26 VITALS — HEIGHT: 71 IN | WEIGHT: 220 LBS | BODY MASS INDEX: 30.8 KG/M2

## 2024-08-26 DIAGNOSIS — R06.02 SOB (SHORTNESS OF BREATH): ICD-10-CM

## 2024-08-26 DIAGNOSIS — R06.00 DYSPNEA AND RESPIRATORY ABNORMALITIES: Primary | ICD-10-CM

## 2024-08-26 DIAGNOSIS — R09.81 NASAL CONGESTION: ICD-10-CM

## 2024-08-26 DIAGNOSIS — F41.9 ANXIETY: ICD-10-CM

## 2024-08-26 DIAGNOSIS — R06.89 DYSPNEA AND RESPIRATORY ABNORMALITIES: Primary | ICD-10-CM

## 2024-08-26 DIAGNOSIS — G47.9 SLEEP DISTURBANCE: ICD-10-CM

## 2024-08-26 PROCEDURE — 99214 OFFICE O/P EST MOD 30 MIN: CPT | Mod: 95 | Performed by: FAMILY MEDICINE

## 2024-08-26 PROCEDURE — 3008F BODY MASS INDEX DOCD: CPT | Performed by: FAMILY MEDICINE

## 2024-08-26 RX ORDER — HYDROXYZINE PAMOATE 25 MG/1
25 CAPSULE ORAL 3 TIMES DAILY PRN
Qty: 30 CAPSULE | Refills: 0 | Status: SHIPPED | OUTPATIENT
Start: 2024-08-26 | End: 2024-09-05

## 2024-08-26 NOTE — PATIENT INSTRUCTIONS
Instructions:   Continue current medications . Add hydroxyzine 25mg tid as needed or may take just at night   Choose healthy foods, exercise and drink plenty of fluid   Get adequate sleep , goal is 7 -8 hours per night   Schedule  labs ---fasting not needed  Schedule with cardiology , pulmonary , sleep medicine for PSG test for sleep apnea , and schedule with ENT       Follow up with Dr Salinas in  1 month .  In office or telemedicine visit ok .

## 2024-08-26 NOTE — PROGRESS NOTES
Verification of Patient Location:  The patient affirms they are currently located in the following state: [place]    Are you in your home or a private residence? Yes    Request for Consent:    Audio and Video Encounter   Hello, my name is Marilee SalinasDO.  Before we proceed, can you please verify your identification by telling me your full name and date of birth?  Can you tell me who is in the room with you?    You and I are about to have a telemedicine check-in or visit because you have requested it.  This is a live video-conference.  I am a real person, speaking to you in real time.  There is no one else with me on the video-conference. I am not recording this conversation and I am asking you not to record it.  This telemedicine visit will be billed to your health insurance or you, if you are self-insured.  You understand you will be responsible for any copayments or coinsurances that apply to your telemedicine visit.  Communication platform used for this encounter:  [employer] Video Visit (Epic Video Client)       Before starting our telemedicine visit, I am required to get your consent for this virtual check-in or visit by telemedicine. Do you consent?    Patient Response to Request for Consent:  Yes      Visit Documentation:  Subjective   Sob , worsening with exertion since recent covid infection . 3rd time had covid  Covid infection  7/13/2024   Seen in ER beginning of Aug .  Blood D-Dimer Testing normal ,     Patient ID: Naomi Lopez is a 39 y.o. female.  1985      HPI :   Patient has been awakening with her heart pounding and choking sensation at night since after recent July 2024 covid infection   She does admit to some anxiety however, had not noticed anything that may be triggering it   She is concerned if serious enough that she may pass away at night   She also notices more sob especially with exertion since latest covid infection   She denies fever, chills, bodyaches or cough   She  denies dysphagia while eating food.    Patient has decreased caffeine ( mostly stopped ) , does not drink alcohol and says she is drinking plenty of water to stay hydrated .    Patient went to ED due to her symptoms   She had lab drawn for D -Dimer . It was normal .                 The following have been reviewed and updated as appropriate in this visit:   Tobacco  Allergies  Meds  Problems       Review of Systems   Constitutional:  Negative for chills, fatigue and fever.   HENT:  Negative for trouble swallowing.    Respiratory:  Positive for choking (sensation of choking when awakening at night) and shortness of breath. Negative for cough.    Cardiovascular:  Positive for palpitations (awakens with heart pounding).   Gastrointestinal:  Negative for nausea and vomiting.   Musculoskeletal:  Positive for back pain (with RLE radiating pain). Negative for gait problem and myalgias.   Skin:  Negative for rash.   Neurological:  Negative for dizziness, syncope, numbness (intermittnet numbness RLE) and headaches.             Assessment/Plan   Diagnoses and all orders for this visit:    Dyspnea and respiratory abnormalities (Primary)  Comments:  awakens with heart pounding , choking   r/o potential sources : cardio , pulm , sleep apnea , metabolic , panic attack  Orders:  -     Ambulatory referral to Cardiology; Future  -     Ambulatory referral to Pulmonology; Future    SOB (shortness of breath)  Comments:  evaluate for underlying cause   refer cardiology and pulmonary   consider anxiety  Orders:  -     Ambulatory referral to Cardiology; Future  -     Ambulatory referral to Pulmonology; Future  -     CBC and differential; Future  -     TSH w reflex FT4; Future  -     T3, free; Future  -     Basic metabolic panel; Future  -     Magnesium; Future    Sleep disturbance  Comments:  refer for PSG and if postiive, refer to sleep medicine  unable to answer stop Bang questions since lives alone , has   not a witness to observe  snoring or apnea  Orders:  -     Polysomnogram (Sleep Study); Future  -     Ambulatory referral to Pulmonology; Future  -     Ambulatory referral to ENT; Future    Nasal congestion  Comments:  use flonase and saline nasal sprays /or rinse ,  add a humidifier  refer ENT to r/o septal deviation , nasal polyps or other obstructive signs  Orders:  -     Ambulatory referral to ENT; Future    Anxiety  Assessment & Plan:  Use mindfulness tools  to help with anxiety ie : breathing exercises , imagery   Grounding techniques, music or relaxing sounds     Cardio exercise    Start hydroxyzine 25mg tid or just nightly       Other orders  -     hydrOXYzine (VistariL) 25 mg capsule; Take 1 capsule (25 mg total) by mouth 3 (three) times a day as needed for anxiety for up to 10 days.      Ordered lab : r/o metabolic source of sx's .  CBC , CMP, TFT's , magnesium    Time Spent:  I spent 29 minutes on this date of service performing the following activities: obtaining history, entering orders, documenting, preparing for visit, obtaining / reviewing records, providing counseling and education, communicating results, and coordinating care.

## 2024-08-26 NOTE — TELEPHONE ENCOUNTER
Patient has been having symptoms of shortness of breath for a month after having covid 3 times   and heart pounding while she is sleeping    Exposure to covid a while ago     Went to the ER 3 weeks ago    Abnormal ECG     Chest xray was normal     Scheduled patient with you today

## 2024-08-27 ENCOUNTER — TELEPHONE (OUTPATIENT)
Dept: SLEEP MEDICINE | Facility: HOSPITAL | Age: 39
End: 2024-08-27
Payer: COMMERCIAL

## 2024-08-28 ASSESSMENT — ENCOUNTER SYMPTOMS
VOMITING: 0
CHOKING: 1
CHILLS: 0
TROUBLE SWALLOWING: 0
MYALGIAS: 0
FEVER: 0
BACK PAIN: 1
FATIGUE: 0
COUGH: 0
PALPITATIONS: 1
NAUSEA: 0
DIZZINESS: 0
NUMBNESS: 0
SHORTNESS OF BREATH: 1
HEADACHES: 0

## 2024-08-28 NOTE — ASSESSMENT & PLAN NOTE
Use mindfulness tools  to help with anxiety ie : breathing exercises , imagery   Grounding techniques, music or relaxing sounds     Cardio exercise    Start hydroxyzine 25mg tid or just nightly

## 2024-08-29 ENCOUNTER — TELEPHONE (OUTPATIENT)
Dept: SLEEP MEDICINE | Facility: HOSPITAL | Age: 39
End: 2024-08-29
Payer: COMMERCIAL

## 2024-08-29 NOTE — TELEPHONE ENCOUNTER
INSURANCE APPROVED PRIOR AUTH FOR IN-LAB SLEEP STUDY. CALLED TO SCHEDULE. VM NOT SET UP. UNABLE TO LEAVE A MESSAGE.    AUTH#[id number] EXP: 11/26/24    [facility]  [phone]

## 2024-09-04 LAB
BASOPHILS # BLD AUTO: 0 X10E3/UL (ref 0–0.2)
BASOPHILS NFR BLD AUTO: 1 %
EOSINOPHIL # BLD AUTO: 0.1 X10E3/UL (ref 0–0.4)
EOSINOPHIL NFR BLD AUTO: 2 %
ERYTHROCYTE [DISTWIDTH] IN BLOOD BY AUTOMATED COUNT: 13.9 % (ref 11.7–15.4)
HCT VFR BLD AUTO: 42.9 % (ref 34–46.6)
HGB BLD-MCNC: 14.1 G/DL (ref 11.1–15.9)
IMM GRANULOCYTES # BLD AUTO: 0 X10E3/UL (ref 0–0.1)
IMM GRANULOCYTES NFR BLD AUTO: 0 %
LYMPHOCYTES # BLD AUTO: 1.7 X10E3/UL (ref 0.7–3.1)
LYMPHOCYTES NFR BLD AUTO: 28 %
MCH RBC QN AUTO: 28.2 PG (ref 26.6–33)
MCHC RBC AUTO-ENTMCNC: 32.9 G/DL (ref 31.5–35.7)
MCV RBC AUTO: 86 FL (ref 79–97)
MONOCYTES # BLD AUTO: 0.3 X10E3/UL (ref 0.1–0.9)
MONOCYTES NFR BLD AUTO: 6 %
NEUTROPHILS # BLD AUTO: 3.9 X10E3/UL (ref 1.4–7)
NEUTROPHILS NFR BLD AUTO: 63 %
PLATELET # BLD AUTO: 281 X10E3/UL (ref 150–450)
RBC # BLD AUTO: 5 X10E6/UL (ref 3.77–5.28)
WBC # BLD AUTO: 6.1 X10E3/UL (ref 3.4–10.8)

## 2024-09-05 LAB
BUN SERPL-MCNC: 8 MG/DL (ref 6–20)
BUN/CREAT SERPL: 11 (ref 9–23)
CALCIUM SERPL-MCNC: 9.6 MG/DL (ref 8.7–10.2)
CHLORIDE SERPL-SCNC: 104 MMOL/L (ref 96–106)
CO2 SERPL-SCNC: 19 MMOL/L (ref 20–29)
CREAT SERPL-MCNC: 0.71 MG/DL (ref 0.57–1)
EGFRCR SERPLBLD CKD-EPI 2021: 111 ML/MIN/1.73
GLUCOSE SERPL-MCNC: 97 MG/DL (ref 70–99)
MAGNESIUM SERPL-MCNC: 2.4 MG/DL (ref 1.6–2.3)
POTASSIUM SERPL-SCNC: 4.2 MMOL/L (ref 3.5–5.2)
SODIUM SERPL-SCNC: 138 MMOL/L (ref 134–144)
T3FREE SERPL-MCNC: 2.9 PG/ML (ref 2–4.4)
T4 FREE SERPL-MCNC: 1.22 NG/DL (ref 0.82–1.77)
TSH SERPL DL<=0.005 MIU/L-ACNC: 1.28 UIU/ML (ref 0.45–4.5)

## 2024-09-17 ENCOUNTER — HOSPITAL ENCOUNTER (OUTPATIENT)
Dept: SLEEP MEDICINE | Facility: HOSPITAL | Age: 39
Discharge: HOME | End: 2024-09-17
Attending: FAMILY MEDICINE
Payer: COMMERCIAL

## 2024-09-17 DIAGNOSIS — G47.9 SLEEP DISTURBANCE: ICD-10-CM

## 2024-09-17 PROCEDURE — G0399 HOME SLEEP TEST/TYPE 3 PORTA: HCPCS

## 2024-09-19 VITALS — BODY MASS INDEX: 30.8 KG/M2 | HEIGHT: 71 IN | WEIGHT: 220 LBS

## 2024-09-20 ENCOUNTER — TELEPHONE (OUTPATIENT)
Dept: FAMILY MEDICINE | Facility: CLINIC | Age: 39
End: 2024-09-20
Payer: COMMERCIAL

## 2024-09-20 NOTE — TELEPHONE ENCOUNTER
Patient called tested positive for covid-19 and would like paxlovid medication sent to the   [facility] .      Patient said her symptoms today are shortness of breathe, cough and headache and loss of taste.      Patient said the covid-19 test was done done 9/20/2024 and it was positive.        Patient said her symptoms started on 9/18/2024.

## 2024-09-30 ENCOUNTER — TELEPHONE (OUTPATIENT)
Dept: FAMILY MEDICINE | Facility: CLINIC | Age: 39
End: 2024-09-30
Payer: COMMERCIAL

## 2024-10-01 ENCOUNTER — TRANSCRIBE ORDERS (OUTPATIENT)
Dept: RADIOLOGY | Facility: HOSPITAL | Age: 39
End: 2024-10-01

## 2024-10-01 ENCOUNTER — HOSPITAL ENCOUNTER (OUTPATIENT)
Dept: RADIOLOGY | Facility: HOSPITAL | Age: 39
Discharge: HOME | End: 2024-10-01
Attending: INTERNAL MEDICINE
Payer: COMMERCIAL

## 2024-10-01 DIAGNOSIS — R06.09 OTHER FORMS OF DYSPNEA: Primary | ICD-10-CM

## 2024-10-01 DIAGNOSIS — R06.09 OTHER FORMS OF DYSPNEA: ICD-10-CM

## 2024-10-01 PROCEDURE — 71046 X-RAY EXAM CHEST 2 VIEWS: CPT

## 2024-10-09 ENCOUNTER — TELEPHONE (OUTPATIENT)
Dept: FAMILY MEDICINE | Facility: CLINIC | Age: 39
End: 2024-10-09
Payer: COMMERCIAL

## 2024-10-09 NOTE — TELEPHONE ENCOUNTER
[facility] Appointment Request   Provider: Dr. Salinas  Appointment Type: OV  Reason for Visit: Pt currently scheduled for an appointment with Dr Salinas 11/4/24 & requesting to reschedule.Pt stated she will only see Dr Salinas.    Available Day and Time: anytime before 10/18/24  Best Contact Number: [phone]    The practice will reach out to schedule your appointment within the next 2 business days.

## 2024-10-11 NOTE — PROCEDURE: PHOTO-DOCUMENTATION
Immediate Brief Procedure Note    Patient Name:  Janice Camp  YOB: 1979  DATE OF PROCEDURE : 10/11/2024  PROCEDURALIST: Dmitri Sadler MD  ASSISTANT(S):  None  ANESTHESIA TYPE:  No sedation  ANESTHESIOLOGIST:  None    PROCEDURE PERFORMED: Port Removal.    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    Estimated Blood Loss: Less than 5 ml.    Complications: None noted    Specimens Removed: No    
Photo Preface (Leave Blank If You Do Not Want): Photographs were obtained today
Detail Level: Zone

## 2024-10-18 ENCOUNTER — APPOINTMENT (RX ONLY)
Dept: URBAN - METROPOLITAN AREA CLINIC 374 | Facility: CLINIC | Age: 39
Setting detail: DERMATOLOGY
End: 2024-10-18

## 2024-10-18 DIAGNOSIS — L71.0 PERIORAL DERMATITIS: ICD-10-CM | Status: INADEQUATELY CONTROLLED

## 2024-10-18 DIAGNOSIS — R23.3 SPONTANEOUS ECCHYMOSES: ICD-10-CM | Status: RESOLVING

## 2024-10-18 PROCEDURE — 99213 OFFICE O/P EST LOW 20 MIN: CPT

## 2024-10-18 PROCEDURE — ? COUNSELING

## 2024-10-18 PROCEDURE — ? PRESCRIPTION

## 2024-10-18 PROCEDURE — ? ADDITIONAL NOTES

## 2024-10-18 PROCEDURE — ? PRESCRIPTION MEDICATION MANAGEMENT

## 2024-10-18 RX ORDER — PIMECROLIMUS 10 MG/G
1 CREAM TOPICAL BID
Qty: 60 | Refills: 2 | Status: ERX | COMMUNITY
Start: 2024-10-18

## 2024-10-18 RX ORDER — DOXYCYCLINE 100 MG/1
1 CAPSULE ORAL BID
Qty: 60 | Refills: 2 | Status: ERX | COMMUNITY
Start: 2024-10-18

## 2024-10-18 RX ADMIN — PIMECROLIMUS 1: 10 CREAM TOPICAL at 00:00

## 2024-10-18 RX ADMIN — DOXYCYCLINE 1: 100 CAPSULE ORAL at 00:00

## 2024-10-18 ASSESSMENT — LOCATION SIMPLE DESCRIPTION DERM
LOCATION SIMPLE: LEFT LIP
LOCATION SIMPLE: ABDOMEN
LOCATION SIMPLE: RIGHT LIP

## 2024-10-18 ASSESSMENT — LOCATION DETAILED DESCRIPTION DERM
LOCATION DETAILED: LEFT UPPER CUTANEOUS LIP
LOCATION DETAILED: SUBXIPHOID
LOCATION DETAILED: RIGHT UPPER CUTANEOUS LIP

## 2024-10-18 ASSESSMENT — LOCATION ZONE DERM
LOCATION ZONE: LIP
LOCATION ZONE: TRUNK

## 2024-10-18 ASSESSMENT — SEVERITY ASSESSMENT: SEVERITY: MILD

## 2024-10-18 NOTE — PROCEDURE: ADDITIONAL NOTES
Render Risk Assessment In Note?: no
Detail Level: Detailed
Additional Notes: -She affirms has been evaluated by PCP and at an ER and has had several blood work that all have resulted within normal limits\\n-She affirms has been dealing with other associated symptoms such as fatigue, arthralgias after having a COVID re-infection\\n-She denies weight loss\\n-She denies taking any blood thinners\\n\\n***Given the fact that patient endorses other associated symptoms such as fatigue and arthralgias she was referred to her primary care physician (PCP) for further evaluation \\n

## 2024-10-18 NOTE — PROCEDURE: PRESCRIPTION MEDICATION MANAGEMENT
Render In Strict Bullet Format?: No
Detail Level: Zone
Initiate Treatment: doxycycline monohydrate 100 mg capsule BID: Take by PO BID for with big glass of water and food.\\n\\nElidel 1 % topical cream BID: Apply a thin layer to AA on the face BID

## 2024-10-18 NOTE — HPI: OTHER
Condition:: \"Rash\" on the face
Please Describe Your Condition:: \"Rash\" on the face, noted a few weeks ago. No associated symptoms. No obvious inciting or modifying factors. Mild in severity. No previous treatments.\\nPertinent additional information:\\n-She affirms has been on oral and inhaled corticosteroids due to bronchitis \\n-She denies any allergies to tetracyclines\\n-She denies being pregnant, trying to get pregnant or lactating
Condition:: \"Bruises\" on the trunk
Please Describe Your Condition:: \"Bruises\" on the trunk, intermittently appearing for the past months. No obvious inciting or modifying factors. Mild in severity. No previous treatments. \\nPertinent additional information:\\n-She affirms has been evaluated by PCP and at an ER and has had several blood work that all have resulted within normal limits\\n-She affirms has been dealing with other associated symptoms such as fatigue, arthralgias after having a COVID re-infection \\n-She denies weight loss \\n-She denies taking any blood thinners

## 2024-10-29 ENCOUNTER — OFFICE VISIT (OUTPATIENT)
Dept: FAMILY MEDICINE | Facility: CLINIC | Age: 39
End: 2024-10-29
Payer: COMMERCIAL

## 2024-10-29 VITALS — BODY MASS INDEX: 29.71 KG/M2 | WEIGHT: 213 LBS | TEMPERATURE: 97.4 F | HEART RATE: 80 BPM | OXYGEN SATURATION: 97 %

## 2024-10-29 DIAGNOSIS — R20.2 PARESTHESIAS: ICD-10-CM

## 2024-10-29 DIAGNOSIS — R14.0 BLOATING: ICD-10-CM

## 2024-10-29 DIAGNOSIS — K29.50 CHRONIC GASTRITIS WITHOUT BLEEDING, UNSPECIFIED GASTRITIS TYPE: ICD-10-CM

## 2024-10-29 DIAGNOSIS — F41.9 ANXIETY: ICD-10-CM

## 2024-10-29 DIAGNOSIS — R68.89 SENSITIVITY TO THE COLD: Primary | ICD-10-CM

## 2024-10-29 PROCEDURE — 99214 OFFICE O/P EST MOD 30 MIN: CPT | Performed by: FAMILY MEDICINE

## 2024-10-29 PROCEDURE — 3008F BODY MASS INDEX DOCD: CPT | Performed by: FAMILY MEDICINE

## 2024-10-29 NOTE — PATIENT INSTRUCTIONS
Instructions:   Continue current medications   Choose healthy foods, exercise and drink plenty of fluid   Get adequate sleep , goal is 7 -8 hours per night   Schedule non fasting labs   Refer to allergist   Refer for abdominal Ultrasound    Follow up with Dr Salinas in  2 months

## 2024-10-29 NOTE — PROGRESS NOTES
[facility]           HISTORY OF PRESENT ILLNESS      Naomi Lopez is a 39 y.o.  female who presents for follow up :     C/o of episodes of sob , accelerating HR, feeling of unease , feeling  of cold ,  Symptoms  seem to be exacerbated by lack of sleep , stress , alcohol   Recent cardiac testing  including echo have been normal  .    Patient also has concerns for pains in foot , arms, and numbness .   She has concerns she if she may have  abnormal  circulation   C/o feels frequently  superbloated   And also c/o frequent bruising ( darkening in areas on skin )     Hx of covid twice within a few months .   Questionable could above symptoms be due to long covid . .      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    FAMILY HISTORY      Family History   Problem Relation Name Age of Onset    Gout Biological Mother      Heart attack Maternal Grandfather      Stomach cancer Paternal Grandmother      Alzheimer's disease Paternal Grandfather         SOCIAL HISTORY      Social History     Socioeconomic History    Marital status: Single     Spouse name: None    Number of children: None    Years of education: None    Highest education level: None   Occupational History    Occupation: Therapist    Tobacco Use    Smoking status: Never    Smokeless tobacco: Never   Vaping Use    Vaping status: Never Used   Substance and Sexual Activity     Alcohol use: Yes     Alcohol/week: 2.0 standard drinks of alcohol     Types: 1 Glasses of wine, 1 Cans of beer per week     Comment: socially     Drug use: No    Sexual activity: Not Currently         Reviewed today : allergies/meds/problem list /social hx      REVIEW OF SYSTEMS      .Review of Systems   Constitutional:  Negative for appetite change and fatigue.   Eyes:  Negative for photophobia, redness and visual disturbance.   Respiratory:  Negative for cough, shortness of breath and wheezing.    Cardiovascular:  Negative for chest pain, palpitations and leg swelling.   Gastrointestinal:  Negative for constipation, diarrhea, nausea and vomiting.        + bloating    Endocrine: Positive for cold intolerance.   Musculoskeletal:  Positive for arthralgias.   Skin:  Positive for color change (Dark patch between and under rigtht breast).   Neurological:  Positive for numbness. Negative for dizziness and headaches.   Hematological:  Does not bruise/bleed easily.   Psychiatric/Behavioral:          + intermittent stress       PHYSICAL EXAMINATION      Visit Vitals  Pulse 80   Temp 36.3 °C (97.4 °F) (Temporal)   Wt 96.6 kg (213 lb)   SpO2 97%   BMI 29.71 kg/m²     Body mass index is 29.71 kg/m².    Physical Exam  Constitutional:       General: She is not in acute distress.     Appearance: She is well-developed. She is not ill-appearing.   Cardiovascular:      Rate and Rhythm: Normal rate and regular rhythm.      Heart sounds: No murmur heard.  Pulmonary:      Breath sounds: No wheezing, rhonchi or rales.   Abdominal:      Palpations: There is no mass.      Tenderness: There is no abdominal tenderness.   Musculoskeletal:      Right lower leg: No edema.      Left lower leg: No edema.           LABS / IMAGING / STUDIES        Labs  Lab Results   Component Value Date    CREATININE 0.71 09/04/2024     Lab Results   Component Value Date    WBC 6.1 09/04/2024    HGB 14.1 09/04/2024    HCT 42.9 09/04/2024    MCV 86 09/04/2024  "    09/04/2024         No results found for: \"HGBA1C\"  No results found for: \"MICROALBUR\", \"[id number]\"      HEALTH MAINTENANCE                    ASSESSMENT AND PLAN   Problem List Items Addressed This Visit          Unprioritized    Anxiety     Patient is aware with so many different area symptoms , and hx of stressors , lack of sleep   Her symptoms can be due to anxiety . She sees a behavioral health therapist .   Not yet ready to start a med for anxiety          Other Visit Diagnoses       Sensitivity to the cold    -  Primary    TSH normal   ordered ANTONINA r/o autoimmune cause   ordered CRP for inflammatory marker    Relevant Orders    ANTONINA Screen (Automated)    Paresthesias        have discussed symptoms inclusive for  anxiety  also r/o autoimmune : orderd ANTONINA, CRP    Relevant Orders    ANTONINA Screen (Automated)    C-reactive protein    Bloating        refer for abdominal US for  further assessment  follow also with gyn for evaluation    Relevant Orders    Ambulatory referral to Allergy    ULTRASOUND ABDOMEN COMPLETE (Completed)    Chronic gastritis without bleeding, unspecified gastritis type        refer for abd US to assess biliary system , pancreas or other abdominal pathology  suggested adding famotidine 20 mg daily    Relevant Orders    ULTRASOUND ABDOMEN COMPLETE (Completed)        Also patient with concerns ie : bloating  can  related to foods or allergens .    Referred her to allergist     Consider symptoms from  long covid.  Refer to  clinic at [place] after 3 mos covid ---that would be dec     Refer abdominal US  for bloating and gastritis sx's .    RTO 2 months          Marilee Salinas, DO  10/31/24        "

## 2024-10-30 ENCOUNTER — HOSPITAL ENCOUNTER (OUTPATIENT)
Dept: RADIOLOGY | Facility: HOSPITAL | Age: 39
Discharge: HOME | End: 2024-10-30
Attending: FAMILY MEDICINE
Payer: COMMERCIAL

## 2024-10-30 DIAGNOSIS — R14.0 BLOATING: ICD-10-CM

## 2024-10-30 DIAGNOSIS — K29.50 CHRONIC GASTRITIS WITHOUT BLEEDING, UNSPECIFIED GASTRITIS TYPE: ICD-10-CM

## 2024-10-30 PROCEDURE — 76700 US EXAM ABDOM COMPLETE: CPT

## 2024-10-31 ASSESSMENT — ENCOUNTER SYMPTOMS
WHEEZING: 0
HEADACHES: 0
SHORTNESS OF BREATH: 0
COUGH: 0
FATIGUE: 0
NUMBNESS: 1
ARTHRALGIAS: 1
COLOR CHANGE: 1
ROS GI COMMENTS: + BLOATING
NAUSEA: 0
PALPITATIONS: 0
EYE REDNESS: 0
BRUISES/BLEEDS EASILY: 0
DIZZINESS: 0
CONSTIPATION: 0
PHOTOPHOBIA: 0
APPETITE CHANGE: 0
VOMITING: 0
DIARRHEA: 0

## 2024-11-01 DIAGNOSIS — R68.89 INTOLERANCE TO COLD: ICD-10-CM

## 2024-11-01 DIAGNOSIS — R20.2 PARESTHESIA: ICD-10-CM

## 2024-11-01 DIAGNOSIS — K76.0 FATTY LIVER: Primary | ICD-10-CM

## 2024-11-01 NOTE — PROGRESS NOTES
Placed lab order for repeat LFT;s , also added ANTONINA and Crp  Recent dx of fatty liver seen on abd us .   Also with mild hepatomegaly     Refer to GI , Dr Guillermo Fajardo   Left voice message with patient re: results of US and above recommendations

## 2024-11-06 LAB
ALBUMIN SERPL-MCNC: 4.6 G/DL (ref 3.9–4.9)
ALP SERPL-CCNC: 57 IU/L (ref 44–121)
ALT SERPL-CCNC: 12 IU/L (ref 0–32)
ANA SER QL IF: NEGATIVE
AST SERPL-CCNC: 19 IU/L (ref 0–40)
BILIRUB DIRECT SERPL-MCNC: 0.25 MG/DL (ref 0–0.4)
BILIRUB SERPL-MCNC: 0.9 MG/DL (ref 0–1.2)
CRP SERPL-MCNC: 3 MG/L (ref 0–10)
PROT SERPL-MCNC: 7.2 G/DL (ref 6–8.5)

## 2024-11-27 ENCOUNTER — OFFICE VISIT (OUTPATIENT)
Dept: FAMILY MEDICINE | Facility: CLINIC | Age: 39
End: 2024-11-27
Payer: COMMERCIAL

## 2024-11-27 VITALS — TEMPERATURE: 97.7 F | WEIGHT: 214.6 LBS | OXYGEN SATURATION: 98 % | HEART RATE: 80 BPM | BODY MASS INDEX: 29.93 KG/M2

## 2024-11-27 DIAGNOSIS — M79.604 RIGHT LEG PAIN: Primary | ICD-10-CM

## 2024-11-27 PROCEDURE — 3008F BODY MASS INDEX DOCD: CPT | Performed by: FAMILY MEDICINE

## 2024-11-27 PROCEDURE — 99213 OFFICE O/P EST LOW 20 MIN: CPT | Performed by: FAMILY MEDICINE

## 2024-11-27 NOTE — PROGRESS NOTES
[facility]           HISTORY OF PRESENT ILLNESS      Naomi Lopez is a 39 y.o.  female who presents for RLE for a few days   Pain in posterior leg, occasional cramping   Denies swelling,   Denies being sedentary , trauma , or on birth control   Wells risk is 1 ( superficial veins present )  . Low risk DVT  however will order venous US to r/o DVT    Has hx of LBP with intermittent RLE sciatica   Though had not been feel feeling radicular symptoms previously to foot      PAST MEDICAL AND SURGICAL HISTORY      Past Medical History:   Diagnosis Date    Chalazion     Dysplastic nevi     HPV (human papilloma virus) anogenital infection     cured     HPV in female     Mononucleosis     Renal failure after problem involving hydatidiform mole or other abnormal product of conception     Rosacea     Rosacea     Rosacea        Past Surgical History   Procedure Laterality Date    Orif humerus fracture Right 1991       MEDICATIONS        Current Outpatient Medications:     cycloSPORINE (RESTASIS) 0.05 % ophthalmic emulsion, , Disp: , Rfl:     ALLERGIES      Patient has no known allergies.    FAMILY HISTORY      Family History   Problem Relation Name Age of Onset    Gout Biological Mother      Heart attack Maternal Grandfather      Stomach cancer Paternal Grandmother      Alzheimer's disease Paternal Grandfather         SOCIAL HISTORY      Social History     Socioeconomic History    Marital status: Single     Spouse name: None    Number of children: None    Years of education: None    Highest education level: None   Occupational History    Occupation: Therapist    Tobacco Use    Smoking status: Never    Smokeless tobacco: Never   Vaping Use    Vaping status: Never Used   Substance and Sexual Activity    Alcohol use: Yes     Alcohol/week: 2.0 standard drinks of alcohol     Types: 1 Glasses of wine, 1 Cans of beer per week     Comment: socially     Drug use: No    Sexual activity: Not Currently  "        Reviewed today : allergies/meds/problem list /social hx      REVIEW OF SYSTEMS      .Review of Systems   Musculoskeletal:  Positive for back pain (intermittent with sciatica) and myalgias (RLE cramping and intermittent pain). Negative for joint swelling.       PHYSICAL EXAMINATION      Visit Vitals  Pulse 80   Temp 36.5 °C (97.7 °F) (Temporal)   Wt 97.3 kg (214 lb 9.6 oz)   SpO2 98%   BMI 29.93 kg/m²     Body mass index is 29.93 kg/m².    Physical Exam  Musculoskeletal:      Comments: RLE : no erythema , no skin changes ,   Negative Aníbal's   Neg calf squeeze   There are some superficial veins present behind knee , no swelling  Both left and right LE appear symmetrical in circumference    RLE :   Negative SLRT   +5/5 strength    LLE :   Negative SLRT  +5/5 strength    Neurological:      Mental Status: She is alert.           LABS / IMAGING / STUDIES        Labs  Lab Results   Component Value Date    CREATININE 0.71 09/04/2024     Lab Results   Component Value Date    WBC 6.1 09/04/2024    HGB 14.1 09/04/2024    HCT 42.9 09/04/2024    MCV 86 09/04/2024     09/04/2024         No results found for: \"HGBA1C\"  No results found for: \"MICROALBUR\", \"[id number]\"      HEALTH MAINTENANCE                     ASSESSMENT AND PLAN   Problem List Items Addressed This Visit    None  Visit Diagnoses       Right leg pain    -  Primary    refer for Right Lower extremity venous doppler r/o DVT  though low suspicion   if negative ,  likely due to lumbar radiculitis --> recommend stretching exercise    Relevant Orders    Ultrasound venous leg right        Allso has fatty liver per abdominal US;   Discussed recommendation to lose weight . Goal 10 lb weight loss with healthy food choices , small portion sizes and exercise     RTO prn           Marilee Salinas, DO  11/28/24        "

## 2024-11-27 NOTE — PATIENT INSTRUCTIONS
Instructions:   Schedule right lower extremity venous doppler to rule out DVT   Continue  healthy food choices and goal weight loss 10 lbs     Return to see Dr Salinas  as needed or in Jan 2025

## 2024-11-28 PROBLEM — K76.0 FATTY LIVER: Status: ACTIVE | Noted: 2024-11-28

## 2024-11-28 ASSESSMENT — ENCOUNTER SYMPTOMS
BACK PAIN: 1
MYALGIAS: 1
JOINT SWELLING: 0

## 2024-12-03 ENCOUNTER — HOSPITAL ENCOUNTER (OUTPATIENT)
Dept: RADIOLOGY | Facility: CLINIC | Age: 39
Discharge: HOME | End: 2024-12-03
Attending: FAMILY MEDICINE
Payer: COMMERCIAL

## 2024-12-03 DIAGNOSIS — M79.604 RIGHT LEG PAIN: ICD-10-CM

## 2024-12-03 PROCEDURE — 93971 EXTREMITY STUDY: CPT | Mod: RT

## 2024-12-10 ENCOUNTER — RX ONLY (RX ONLY)
Age: 39
End: 2024-12-10

## 2024-12-10 ENCOUNTER — APPOINTMENT (OUTPATIENT)
Dept: URBAN - METROPOLITAN AREA CLINIC 374 | Facility: CLINIC | Age: 39
Setting detail: DERMATOLOGY
End: 2024-12-10

## 2024-12-10 DIAGNOSIS — D22 MELANOCYTIC NEVI: ICD-10-CM | Status: UNCHANGED

## 2024-12-10 DIAGNOSIS — L71.0 PERIORAL DERMATITIS: ICD-10-CM | Status: IMPROVED

## 2024-12-10 DIAGNOSIS — D18.0 HEMANGIOMA: ICD-10-CM | Status: UNCHANGED

## 2024-12-10 PROBLEM — D22.5 MELANOCYTIC NEVI OF TRUNK: Status: ACTIVE | Noted: 2024-12-10

## 2024-12-10 PROBLEM — D18.01 HEMANGIOMA OF SKIN AND SUBCUTANEOUS TISSUE: Status: ACTIVE | Noted: 2024-12-10

## 2024-12-10 PROCEDURE — 99213 OFFICE O/P EST LOW 20 MIN: CPT

## 2024-12-10 PROCEDURE — ? PRESCRIPTION MEDICATION MANAGEMENT

## 2024-12-10 PROCEDURE — ? COUNSELING

## 2024-12-10 RX ORDER — PIMECROLIMUS 10 MG/G
1 CREAM TOPICAL BID
Qty: 60 | Refills: 5 | Status: ERX

## 2024-12-10 ASSESSMENT — LOCATION DETAILED DESCRIPTION DERM
LOCATION DETAILED: RIGHT UPPER CUTANEOUS LIP
LOCATION DETAILED: LEFT MEDIAL UPPER BACK
LOCATION DETAILED: LEFT UPPER CUTANEOUS LIP
LOCATION DETAILED: SUPERIOR THORACIC SPINE

## 2024-12-10 ASSESSMENT — LOCATION SIMPLE DESCRIPTION DERM
LOCATION SIMPLE: UPPER BACK
LOCATION SIMPLE: LEFT LIP
LOCATION SIMPLE: LEFT UPPER BACK
LOCATION SIMPLE: RIGHT LIP

## 2024-12-10 ASSESSMENT — INVESTIGATOR STATIC GLOBAL ASSESSMENT
IN YOUR EXPERIENCE, AMONG ALL PATIENTS YOU HAVE SEEN WITH THIS CONDITION, HOW SEVERE IS THIS PATIENT'S CONDITION?: MINIMAL

## 2024-12-10 ASSESSMENT — LOCATION ZONE DERM
LOCATION ZONE: TRUNK
LOCATION ZONE: LIP

## 2024-12-10 NOTE — HPI: OTHER
Condition:: \"Bumps\" on the back
Please Describe Your Condition:: \"Bumps\" on the back, noted several years ago. No associated symptoms. No obvious inciting or modifying factors. Mild in severity. No previous treatments.\\nPertinent additional information:\\n-She endorses a past history of atypical nevus removed on her back by an outside dermatologist but denies any history of skin cancer

## 2024-12-10 NOTE — PROCEDURE: PRESCRIPTION MEDICATION MANAGEMENT
Render In Strict Bullet Format?: No
Detail Level: Zone
Discontinue Regimen: doxycycline monohydrate 100 mg capsule BID: Take by PO BID for with big glass of water and food.
Initiate Treatment: Elidel 1 % topical cream BID: Apply a thin layer to AA on the face BID

## 2025-06-05 ENCOUNTER — APPOINTMENT (OUTPATIENT)
Dept: URBAN - METROPOLITAN AREA CLINIC 374 | Facility: CLINIC | Age: 40
Setting detail: DERMATOLOGY
End: 2025-06-05

## 2025-06-05 DIAGNOSIS — Z71.89 OTHER SPECIFIED COUNSELING: ICD-10-CM

## 2025-06-05 DIAGNOSIS — L81.4 OTHER MELANIN HYPERPIGMENTATION: ICD-10-CM | Status: UNCHANGED

## 2025-06-05 DIAGNOSIS — Z80.8 FAMILY HISTORY OF MALIGNANT NEOPLASM OF OTHER ORGANS OR SYSTEMS: ICD-10-CM

## 2025-06-05 DIAGNOSIS — D18.0 HEMANGIOMA: ICD-10-CM | Status: UNCHANGED

## 2025-06-05 DIAGNOSIS — D22 MELANOCYTIC NEVI: ICD-10-CM | Status: UNCHANGED

## 2025-06-05 DIAGNOSIS — L91.8 OTHER HYPERTROPHIC DISORDERS OF THE SKIN: ICD-10-CM | Status: UNCHANGED

## 2025-06-05 DIAGNOSIS — L81.2 FRECKLES: ICD-10-CM | Status: UNCHANGED

## 2025-06-05 DIAGNOSIS — L71.0 PERIORAL DERMATITIS: ICD-10-CM | Status: IMPROVED

## 2025-06-05 DIAGNOSIS — Z87.2 PERSONAL HISTORY OF DISEASES OF THE SKIN AND SUBCUTANEOUS TISSUE: ICD-10-CM

## 2025-06-05 PROBLEM — D18.01 HEMANGIOMA OF SKIN AND SUBCUTANEOUS TISSUE: Status: ACTIVE | Noted: 2025-06-05

## 2025-06-05 PROBLEM — D22.5 MELANOCYTIC NEVI OF TRUNK: Status: ACTIVE | Noted: 2025-06-05

## 2025-06-05 PROCEDURE — ? COUNSELING

## 2025-06-05 PROCEDURE — ? DIAGNOSIS COMMENT

## 2025-06-05 PROCEDURE — ? TREATMENT REGIMEN

## 2025-06-05 PROCEDURE — ? PRESCRIPTION MEDICATION MANAGEMENT

## 2025-06-05 PROCEDURE — ? FULL BODY SKIN EXAM

## 2025-06-05 ASSESSMENT — LOCATION ZONE DERM
LOCATION ZONE: AXILLAE
LOCATION ZONE: ARM
LOCATION ZONE: HAND
LOCATION ZONE: TRUNK
LOCATION ZONE: FACE
LOCATION ZONE: NECK
LOCATION ZONE: LIP

## 2025-06-05 ASSESSMENT — LOCATION SIMPLE DESCRIPTION DERM
LOCATION SIMPLE: RIGHT AXILLARY VAULT
LOCATION SIMPLE: LEFT LIP
LOCATION SIMPLE: LEFT AXILLARY VAULT
LOCATION SIMPLE: LEFT CHEEK
LOCATION SIMPLE: LEFT HAND
LOCATION SIMPLE: ABDOMEN
LOCATION SIMPLE: RIGHT UPPER BACK
LOCATION SIMPLE: RIGHT SHOULDER
LOCATION SIMPLE: RIGHT HAND
LOCATION SIMPLE: RIGHT ANTERIOR NECK
LOCATION SIMPLE: RIGHT LIP
LOCATION SIMPLE: RIGHT FOREARM
LOCATION SIMPLE: RIGHT CHEEK
LOCATION SIMPLE: LEFT FOREARM
LOCATION SIMPLE: LEFT SHOULDER
LOCATION SIMPLE: UPPER BACK
LOCATION SIMPLE: LEFT ANTERIOR NECK

## 2025-06-05 ASSESSMENT — LOCATION DETAILED DESCRIPTION DERM
LOCATION DETAILED: LEFT UPPER CUTANEOUS LIP
LOCATION DETAILED: RIGHT PROXIMAL DORSAL FOREARM
LOCATION DETAILED: EPIGASTRIC SKIN
LOCATION DETAILED: RIGHT INFERIOR LATERAL NECK
LOCATION DETAILED: LEFT POSTERIOR SHOULDER
LOCATION DETAILED: RIGHT UPPER CUTANEOUS LIP
LOCATION DETAILED: RIGHT RADIAL DORSAL HAND
LOCATION DETAILED: LEFT RADIAL DORSAL HAND
LOCATION DETAILED: LEFT INFERIOR LATERAL NECK
LOCATION DETAILED: LEFT AXILLARY VAULT
LOCATION DETAILED: RIGHT SUPERIOR MEDIAL UPPER BACK
LOCATION DETAILED: SUPERIOR THORACIC SPINE
LOCATION DETAILED: RIGHT POSTERIOR SHOULDER
LOCATION DETAILED: RIGHT CENTRAL MALAR CHEEK
LOCATION DETAILED: LEFT PROXIMAL DORSAL FOREARM
LOCATION DETAILED: INFERIOR THORACIC SPINE
LOCATION DETAILED: LEFT INFERIOR MEDIAL MALAR CHEEK
LOCATION DETAILED: RIGHT AXILLARY VAULT

## 2025-06-05 NOTE — HPI: EVALUATION OF SKIN LESION(S)
What Type Of Note Output Would You Prefer (Optional)?: Bullet Format
Hpi Title: Evaluation of Skin Lesions
How Severe Are Your Spot(S)?: mild
Additional History: Parents both have history of NMSC

## 2025-06-05 NOTE — PROCEDURE: DIAGNOSIS COMMENT
Render Risk Assessment In Note?: no
Detail Level: Simple
Comment: Mother and father have history of basal and squamous
Comment: History of dysplastic nevus: moderately atypical superior thoracic spine 01/25/17 treated shave removal

## 2025-06-05 NOTE — PROCEDURE: PRESCRIPTION MEDICATION MANAGEMENT
Render In Strict Bullet Format?: No
Detail Level: Zone
Continue Regimen: Elidel 1 % topical cream BID: Apply a thin layer to AA on the face BID